# Patient Record
Sex: MALE | Race: WHITE | NOT HISPANIC OR LATINO | Employment: FULL TIME | URBAN - METROPOLITAN AREA
[De-identification: names, ages, dates, MRNs, and addresses within clinical notes are randomized per-mention and may not be internally consistent; named-entity substitution may affect disease eponyms.]

---

## 2017-06-05 ENCOUNTER — HOSPITAL ENCOUNTER (OUTPATIENT)
Dept: RADIOLOGY | Facility: MEDICAL CENTER | Age: 57
End: 2017-06-05
Attending: INTERNAL MEDICINE
Payer: COMMERCIAL

## 2017-06-15 ENCOUNTER — HOSPITAL ENCOUNTER (OUTPATIENT)
Dept: RADIOLOGY | Facility: MEDICAL CENTER | Age: 57
End: 2017-06-15
Attending: INTERNAL MEDICINE
Payer: COMMERCIAL

## 2017-06-15 DIAGNOSIS — R13.19 CONSTANT LOW-GRADE DYSPHAGIA: ICD-10-CM

## 2017-06-15 PROCEDURE — 74220 X-RAY XM ESOPHAGUS 1CNTRST: CPT

## 2017-06-15 PROCEDURE — 700101 HCHG RX REV CODE 250: Performed by: INTERNAL MEDICINE

## 2017-06-15 RX ADMIN — BARIUM SULFATE 700 MG: 700 TABLET ORAL at 14:35

## 2017-06-29 ENCOUNTER — APPOINTMENT (OUTPATIENT)
Dept: RADIOLOGY | Facility: MEDICAL CENTER | Age: 57
End: 2017-06-29
Attending: EMERGENCY MEDICINE
Payer: COMMERCIAL

## 2017-06-29 ENCOUNTER — HOSPITAL ENCOUNTER (EMERGENCY)
Facility: MEDICAL CENTER | Age: 57
End: 2017-06-29
Attending: EMERGENCY MEDICINE | Admitting: EMERGENCY MEDICINE
Payer: COMMERCIAL

## 2017-06-29 VITALS
TEMPERATURE: 98.4 F | RESPIRATION RATE: 16 BRPM | OXYGEN SATURATION: 94 % | SYSTOLIC BLOOD PRESSURE: 117 MMHG | HEIGHT: 68 IN | WEIGHT: 162.04 LBS | BODY MASS INDEX: 24.56 KG/M2 | HEART RATE: 78 BPM | DIASTOLIC BLOOD PRESSURE: 81 MMHG

## 2017-06-29 DIAGNOSIS — N20.1 URETERAL STONE: ICD-10-CM

## 2017-06-29 LAB
ALBUMIN SERPL BCP-MCNC: 4.2 G/DL (ref 3.2–4.9)
ALBUMIN/GLOB SERPL: 1.8 G/DL
ALP SERPL-CCNC: 53 U/L (ref 30–99)
ALT SERPL-CCNC: 36 U/L (ref 2–50)
ANION GAP SERPL CALC-SCNC: 9 MMOL/L (ref 0–11.9)
APPEARANCE UR: ABNORMAL
AST SERPL-CCNC: 43 U/L (ref 12–45)
BACTERIA #/AREA URNS HPF: ABNORMAL /HPF
BASOPHILS # BLD AUTO: 0.8 % (ref 0–1.8)
BASOPHILS # BLD: 0.09 K/UL (ref 0–0.12)
BILIRUB SERPL-MCNC: 0.6 MG/DL (ref 0.1–1.5)
BILIRUB UR QL STRIP.AUTO: NEGATIVE
BUN SERPL-MCNC: 16 MG/DL (ref 8–22)
CALCIUM SERPL-MCNC: 9.3 MG/DL (ref 8.4–10.2)
CHLORIDE SERPL-SCNC: 103 MMOL/L (ref 96–112)
CO2 SERPL-SCNC: 26 MMOL/L (ref 20–33)
COLOR UR: YELLOW
CREAT SERPL-MCNC: 1.17 MG/DL (ref 0.5–1.4)
CULTURE IF INDICATED INDCX: YES UA CULTURE
EOSINOPHIL # BLD AUTO: 0.56 K/UL (ref 0–0.51)
EOSINOPHIL NFR BLD: 4.7 % (ref 0–6.9)
ERYTHROCYTE [DISTWIDTH] IN BLOOD BY AUTOMATED COUNT: 47.3 FL (ref 35.9–50)
GFR SERPL CREATININE-BSD FRML MDRD: >60 ML/MIN/1.73 M 2
GLOBULIN SER CALC-MCNC: 2.4 G/DL (ref 1.9–3.5)
GLUCOSE SERPL-MCNC: 139 MG/DL (ref 65–99)
GLUCOSE UR STRIP.AUTO-MCNC: NEGATIVE MG/DL
HCT VFR BLD AUTO: 43.6 % (ref 42–52)
HGB BLD-MCNC: 15 G/DL (ref 14–18)
IMM GRANULOCYTES # BLD AUTO: 0.07 K/UL (ref 0–0.11)
IMM GRANULOCYTES NFR BLD AUTO: 0.6 % (ref 0–0.9)
KETONES UR STRIP.AUTO-MCNC: NEGATIVE MG/DL
LEUKOCYTE ESTERASE UR QL STRIP.AUTO: NEGATIVE
LYMPHOCYTES # BLD AUTO: 1.05 K/UL (ref 1–4.8)
LYMPHOCYTES NFR BLD: 8.8 % (ref 22–41)
MCH RBC QN AUTO: 32.4 PG (ref 27–33)
MCHC RBC AUTO-ENTMCNC: 34.4 G/DL (ref 33.7–35.3)
MCV RBC AUTO: 94.2 FL (ref 81.4–97.8)
MICRO URNS: ABNORMAL
MONOCYTES # BLD AUTO: 0.54 K/UL (ref 0–0.85)
MONOCYTES NFR BLD AUTO: 4.5 % (ref 0–13.4)
MUCOUS THREADS #/AREA URNS HPF: ABNORMAL /HPF
NEUTROPHILS # BLD AUTO: 9.59 K/UL (ref 1.82–7.42)
NEUTROPHILS NFR BLD: 80.6 % (ref 44–72)
NITRITE UR QL STRIP.AUTO: NEGATIVE
NRBC # BLD AUTO: 0 K/UL
NRBC BLD AUTO-RTO: 0 /100 WBC
PH UR STRIP.AUTO: 7 [PH]
PLATELET # BLD AUTO: 186 K/UL (ref 164–446)
PMV BLD AUTO: 10.7 FL (ref 9–12.9)
POTASSIUM SERPL-SCNC: 4.4 MMOL/L (ref 3.6–5.5)
PROT SERPL-MCNC: 6.6 G/DL (ref 6–8.2)
PROT UR QL STRIP: NEGATIVE MG/DL
RBC # BLD AUTO: 4.63 M/UL (ref 4.7–6.1)
RBC # URNS HPF: ABNORMAL /HPF
RBC UR QL AUTO: ABNORMAL
SODIUM SERPL-SCNC: 138 MMOL/L (ref 135–145)
SP GR UR STRIP.AUTO: 1.01
WBC # BLD AUTO: 11.9 K/UL (ref 4.8–10.8)
WBC #/AREA URNS HPF: ABNORMAL /HPF

## 2017-06-29 PROCEDURE — 36415 COLL VENOUS BLD VENIPUNCTURE: CPT

## 2017-06-29 PROCEDURE — 74176 CT ABD & PELVIS W/O CONTRAST: CPT

## 2017-06-29 PROCEDURE — 85025 COMPLETE CBC W/AUTO DIFF WBC: CPT

## 2017-06-29 PROCEDURE — 96374 THER/PROPH/DIAG INJ IV PUSH: CPT

## 2017-06-29 PROCEDURE — 81001 URINALYSIS AUTO W/SCOPE: CPT

## 2017-06-29 PROCEDURE — 80053 COMPREHEN METABOLIC PANEL: CPT

## 2017-06-29 PROCEDURE — 700111 HCHG RX REV CODE 636 W/ 250 OVERRIDE (IP): Performed by: EMERGENCY MEDICINE

## 2017-06-29 PROCEDURE — 87086 URINE CULTURE/COLONY COUNT: CPT

## 2017-06-29 PROCEDURE — 99284 EMERGENCY DEPT VISIT MOD MDM: CPT

## 2017-06-29 PROCEDURE — 96375 TX/PRO/DX INJ NEW DRUG ADDON: CPT

## 2017-06-29 PROCEDURE — 96361 HYDRATE IV INFUSION ADD-ON: CPT

## 2017-06-29 PROCEDURE — 96376 TX/PRO/DX INJ SAME DRUG ADON: CPT

## 2017-06-29 PROCEDURE — 700105 HCHG RX REV CODE 258: Performed by: EMERGENCY MEDICINE

## 2017-06-29 RX ORDER — SODIUM CHLORIDE 9 MG/ML
1000 INJECTION, SOLUTION INTRAVENOUS ONCE
Status: COMPLETED | OUTPATIENT
Start: 2017-06-29 | End: 2017-06-29

## 2017-06-29 RX ORDER — ONDANSETRON 2 MG/ML
4 INJECTION INTRAMUSCULAR; INTRAVENOUS ONCE
Status: COMPLETED | OUTPATIENT
Start: 2017-06-29 | End: 2017-06-29

## 2017-06-29 RX ORDER — HYDROCODONE BITARTRATE AND ACETAMINOPHEN 5; 325 MG/1; MG/1
1-2 TABLET ORAL EVERY 6 HOURS PRN
Qty: 20 TAB | Refills: 0 | Status: ON HOLD | OUTPATIENT
Start: 2017-06-29 | End: 2017-08-03

## 2017-06-29 RX ORDER — ONDANSETRON 4 MG/1
4 TABLET, ORALLY DISINTEGRATING ORAL EVERY 6 HOURS PRN
Qty: 20 TAB | Refills: 0 | Status: ON HOLD | OUTPATIENT
Start: 2017-06-29 | End: 2017-08-03

## 2017-06-29 RX ORDER — KETOROLAC TROMETHAMINE 30 MG/ML
15 INJECTION, SOLUTION INTRAMUSCULAR; INTRAVENOUS ONCE
Status: COMPLETED | OUTPATIENT
Start: 2017-06-29 | End: 2017-06-29

## 2017-06-29 RX ADMIN — KETOROLAC TROMETHAMINE 15 MG: 30 INJECTION, SOLUTION INTRAMUSCULAR at 04:24

## 2017-06-29 RX ADMIN — ONDANSETRON 4 MG: 2 INJECTION INTRAMUSCULAR; INTRAVENOUS at 04:23

## 2017-06-29 RX ADMIN — HYDROMORPHONE HYDROCHLORIDE 1 MG: 1 INJECTION, SOLUTION INTRAMUSCULAR; INTRAVENOUS; SUBCUTANEOUS at 06:40

## 2017-06-29 RX ADMIN — SODIUM CHLORIDE 1000 ML: 9 INJECTION, SOLUTION INTRAVENOUS at 04:27

## 2017-06-29 RX ADMIN — HYDROMORPHONE HYDROCHLORIDE 1 MG: 1 INJECTION, SOLUTION INTRAMUSCULAR; INTRAVENOUS; SUBCUTANEOUS at 04:27

## 2017-06-29 ASSESSMENT — PAIN SCALES - GENERAL
PAINLEVEL_OUTOF10: 8
PAINLEVEL_OUTOF10: 8

## 2017-06-29 NOTE — ED AVS SNAPSHOT
6/29/2017    Enrique Joe  [address] [place] [address]    Dear Enrique:    [facility] wants to ensure your discharge home is safe and you or your loved ones have had all of your questions answered regarding your care after you leave the hospital.    Below is a list of resources and contact information should you have any questions regarding your hospital stay, follow-up instructions, or active medical symptoms.    Questions or Concerns Regarding… Contact   Medical Questions Related to Your Discharge  (7 days a week, 8am-5pm) Contact a Nurse Care Coordinator   [phone]   Medical Questions Not Related to Your Discharge  (24 hours a day / 7 days a week)  Contact the Nurse Health Line   [phone]    Medications or Discharge Instructions Refer to your discharge packet   or contact your [facility] Primary Care Provider   [phone]   Follow-up Appointment(s) Schedule your appointment via [employer]   or contact Scheduling [phone]   Billing Review your statement via [employer]  or contact Billing [phone]   Medical Records Review your records via [employer]   or contact Medical Records [phone]     You may receive a telephone call within two days of discharge. This call is to make certain you understand your discharge instructions and have the opportunity to have any questions answered. You can also easily access your medical information, test results and upcoming appointments via the [employer] free online health management tool. You can learn more and sign up at [employer]. For assistance setting up your [employer] account, please call [phone].    Once again, we want to ensure your discharge home is safe and that you have a clear understanding of any next steps in your care. If you have any questions or concerns, please do not hesitate to contact us, we are here for you. Thank you for choosing [facility] for your healthcare needs.    Sincerely,    Your [facility] Healthcare Team

## 2017-06-29 NOTE — ED PROVIDER NOTES
ED Provider Note    ER PROVIDER NOTE    Scribed for Yevgeniy Schofield M.D.  by Yevgeniy Schofield. 6/29/2017 at 4:17 AM.    Primary Care Provider: Deo IZAGUIRRE M.D.  Means of Arrival: Self  History obtained from: Patient  History limited by: None    CHIEF COMPLAINT  Chief Complaint   Patient presents with   • N/V   • Flank Pain     right side, started yesterday morning, hx kidney stones       HPI  Enrique Joe is a 56 y.o. male who presents to the emergency department complaining of right-sided flank pain. Patient has long history of recurrent kidney stones. Last stone was approximately September. He reports that he began feeling the pain yesterday, right, sharp, colicky with some radiation to his groin, similar to stones in the past and has worsened. He's had some nausea as well as a few episodes of emesis secondary to pain. He denies any other abdominal pain, fevers or chills. No dysuria or hematuria.    REVIEW OF SYSTEMS  Pertinent positives include flank pain. Pertinent negatives include no fever. See HPI for details. All other systems reviewed and are negative.    PAST MEDICAL HISTORY   has a past medical history of Kidney stone.    SURGICAL HISTORY   has past surgical history that includes cholecystectomy; lithotripsy; hydrocelectomy child; and vocal cord stripping.    FAMILY HISTORY  Family History   Problem Relation Age of Onset   • Heart Attack Father    • Heart Attack Brother        SOCIAL HISTORY  Social History     Social History   • Marital Status: Single     Spouse Name: N/A   • Number of Children: N/A   • Years of Education: N/A     Social History Main Topics   • Smoking status: Former Smoker -- 0.50 packs/day for 5 years     Types: Cigarettes     Quit date: 06/30/1985   • Smokeless tobacco: None   • Alcohol Use: No   • Drug Use: No   • Sexual Activity: Not Asked     Other Topics Concern   • None     Social History Narrative      History   Drug Use No       CURRENT MEDICATIONS  Home Medications      "Reviewed by Geovanna Toro R.N. (Registered Nurse) on 06/29/17 at 0433  Med List Status: Complete    Medication Last Dose Status    aspirin 81 MG tablet 6/28/2017 Active    lamotrigine (LAMICTAL) 200 MG tablet 6/28/2017 Active    minocycline (MINOCIN) 100 MG Cap 6/28/2017 Active    tamsulosin (FLOMAX) 0.4 MG capsule 6/28/2017 Active    zolpidem (AMBIEN) 5 MG TABS  Active                ALLERGIES  Allergies   Allergen Reactions   • Sulfa Drugs        PHYSICAL EXAM  VITAL SIGNS: /81 mmHg  Pulse 67  Temp(Src) 36.9 °C (98.4 °F)  Resp 18  Ht 1.727 m (5' 7.99\")  Wt 73.5 kg (162 lb 0.6 oz)  BMI 24.64 kg/m2  SpO2 93%  Pulse ox interpretation: I interpret this pulse ox as normal.    Constitutional: Alert, uncomfortable  HENT: No signs of trauma, Bilateral external ears normal, Nose normal.   Eyes: Pupils are equal and reactive, Conjunctiva normal, Non-icteric.   Neck: Normal range of motion, No tenderness, Supple, No stridor.   Lymphatic: No lymphadenopathy noted.   Cardiovascular: Regular rate and rhythm, no murmurs.   Thorax & Lungs: Normal breath sounds, No respiratory distress, No wheezing, No chest tenderness.   Abdomen: Bowel sounds normal, Soft, No tenderness, No masses, No pulsatile masses. No peritoneal signs.  Skin: Warm, Dry, No erythema, No rash.   Back: No bony tenderness, No CVA tenderness.   Extremities: Intact distal pulses, No edema, No tenderness, No cyanosis, Negative Junior's sign.  Musculoskeletal: Good range of motion in all major joints. No tenderness to palpation or major deformities noted.   Neurologic: Alert , Normal motor function, Normal sensory function, No focal deficits noted.   Psychiatric: Affect normal, Judgment normal, Mood normal.     DIAGNOSTIC STUDIES / PROCEDURES        LABS  Results for orders placed or performed during the hospital encounter of 06/29/17   CBC WITH DIFFERENTIAL   Result Value Ref Range    WBC 11.9 (H) 4.8 - 10.8 K/uL    RBC 4.63 (L) 4.70 - 6.10 M/uL    " Hemoglobin 15.0 14.0 - 18.0 g/dL    Hematocrit 43.6 42.0 - 52.0 %    MCV 94.2 81.4 - 97.8 fL    MCH 32.4 27.0 - 33.0 pg    MCHC 34.4 33.7 - 35.3 g/dL    RDW 47.3 35.9 - 50.0 fL    Platelet Count 186 164 - 446 K/uL    MPV 10.7 9.0 - 12.9 fL    Neutrophils-Polys 80.60 (H) 44.00 - 72.00 %    Lymphocytes 8.80 (L) 22.00 - 41.00 %    Monocytes 4.50 0.00 - 13.40 %    Eosinophils 4.70 0.00 - 6.90 %    Basophils 0.80 0.00 - 1.80 %    Immature Granulocytes 0.60 0.00 - 0.90 %    Nucleated RBC 0.00 /100 WBC    Neutrophils (Absolute) 9.59 (H) 1.82 - 7.42 K/uL    Lymphs (Absolute) 1.05 1.00 - 4.80 K/uL    Monos (Absolute) 0.54 0.00 - 0.85 K/uL    Eos (Absolute) 0.56 (H) 0.00 - 0.51 K/uL    Baso (Absolute) 0.09 0.00 - 0.12 K/uL    Immature Granulocytes (abs) 0.07 0.00 - 0.11 K/uL    NRBC (Absolute) 0.00 K/uL   COMP METABOLIC PANEL   Result Value Ref Range    Sodium 138 135 - 145 mmol/L    Potassium 4.4 3.6 - 5.5 mmol/L    Chloride 103 96 - 112 mmol/L    Co2 26 20 - 33 mmol/L    Anion Gap 9.0 0.0 - 11.9    Glucose 139 (H) 65 - 99 mg/dL    Bun 16 8 - 22 mg/dL    Creatinine 1.17 0.50 - 1.40 mg/dL    Calcium 9.3 8.4 - 10.2 mg/dL    AST(SGOT) 43 12 - 45 U/L    ALT(SGPT) 36 2 - 50 U/L    Alkaline Phosphatase 53 30 - 99 U/L    Total Bilirubin 0.6 0.1 - 1.5 mg/dL    Albumin 4.2 3.2 - 4.9 g/dL    Total Protein 6.6 6.0 - 8.2 g/dL    Globulin 2.4 1.9 - 3.5 g/dL    A-G Ratio 1.8 g/dL   URINALYSIS CULTURE, IF INDICATED   Result Value Ref Range    Micro Urine Req Microscopic     Color Yellow     Character Cloudy (A)     Specific Gravity 1.015 <1.035    Ph 7.0 5.0-8.0    Glucose Negative Negative mg/dL    Ketones Negative Negative mg/dL    Protein Negative Negative mg/dL    Bilirubin Negative Negative    Nitrite Negative Negative    Leukocyte Esterase Negative Negative    Occult Blood Large (A) Negative    Culture Indicated Yes UA Culture   ESTIMATED GFR   Result Value Ref Range    GFR If African American >60 >60 mL/min/1.73 m 2    GFR If Non  African American >60 >60 mL/min/1.73 m 2   URINE MICROSCOPIC (W/UA)   Result Value Ref Range    WBC 0-2 (A) /hpf    -150 (A) /hpf    Bacteria Few (A) None /hpf    Mucous Threads Moderate /hpf       All labs reviewed by me.    RADIOLOGY  CT-RENAL COLIC EVALUATION(A/P W/O)   Final Result      1.  5.4 mm right ureteropelvic junction stone with minimal right-sided hydronephrosis.   2.  Multiple punctate nonobstructive right renal stones.   3.  Status post cholecystectomy.        The radiologist's interpretation of all radiological studies have been reviewed by me.    COURSE & MEDICAL DECISION MAKING  Nursing notes, VS, PMSFHx reviewed in chart.    4:17 AM Patient seen and examined at bedside. Patient will be treated with IV Zofran, ketorolac, hydromorphone, and fluids as he is vomiting and for hydration. Ordered for blood work, urinalysis, CT scan to evaluate his symptoms.   5:26 AM patient reevaluated, much more comfortable. States pain almost entirely resolved at this time. Updated on results, pending urinalysis      Decision Making:  This is a very pleasant 56 y.o. male presented with flank pain, found to have 5 mm ureteral stone. Given its size, as well as lack of significant hydronephrosis, as well as improvement in symptoms we'll try conservative management as outpatient. Prescription for Norco, has NSAIDs, prescription for Zofran and already has Flomax. He is followed by Dr. Haile will follow-up with him for further care and recheck. He has no fevers or symptoms to suggest concomitant urinary tract infection as well. No other evidence of surgical intra-abdominal pathology on his CT scan    I reviewed prescription monitoring program for patient's narcotic use before prescribing a scheduled drug.The patient will not drink alcohol nor drive with prescribed medications. The patient will return for new or worsening symptoms and is stable at the time of discharge.    The patient is referred to a primary physician  for blood pressure management, diabetic screening, and for all other preventative health concerns.    DISPOSITION:  Patient will be discharged home in stable condition.    FOLLOW UP:  Shahab Haile M.D.  [address]  [address]  [facility] [address]  [phone]    Schedule an appointment as soon as possible for a visit        OUTPATIENT MEDICATIONS:  New Prescriptions    HYDROCODONE-ACETAMINOPHEN (NORCO) 5-325 MG TAB PER TABLET    Take 1-2 Tabs by mouth every 6 hours as needed.    ONDANSETRON (ZOFRAN ODT) 4 MG TABLET DISPERSIBLE    Take 1 Tab by mouth every 6 hours as needed for Nausea/Vomiting.           FINAL IMPRESSION  1. Ureteral stone         The note accurately reflects work and decisions made by me.  Yevgeniy Schofield  6/29/2017  7:01 AM

## 2017-06-29 NOTE — ED AVS SNAPSHOT
[employer] Access Code: [email or web address]  Expires: 7/15/2017  2:01 PM    [employer]  A secure, online tool to manage your health information     [employer]’s [employer]® is a secure, online tool that connects you to your personalized health information from the privacy of your home -- day or night - making it very easy for you to manage your healthcare. Once the activation process is completed, you can even access your medical information using the [employer] rula, which is available for free in the [employer] Rula store or Google Play store.     [employer] provides the following levels of access (as shown below):   My Chart Features   [facility] Primary Care Doctor [facility]  Specialists [facility]  Urgent  Care Non-[facility]  Primary Care  Doctor   Email your healthcare team securely and privately 24/7 X X X X   Manage appointments: schedule your next appointment; view details of past/upcoming appointments X      Request prescription refills. X      View recent personal medical records, including lab and immunizations X X X X   View health record, including health history, allergies, medications X X X X   Read reports about your outpatient visits, procedures, consult and ER notes X X X X   See your discharge summary, which is a recap of your hospital and/or ER visit that includes your diagnosis, lab results, and care plan. X X       How to register for [employer]:  1. Go to  [email or web address].  2. Click on the Sign Up Now box, which takes you to the New Member Sign Up page. You will need to provide the following information:  a. Enter your [employer] Access Code exactly as it appears at the top of this page. (You will not need to use this code after you’ve completed the sign-up process. If you do not sign up before the expiration date, you must request a new code.)   b. Enter your date of birth.   c. Enter your home email address.   d. Click Submit, and follow the next screen’s instructions.  3. Create a [employer] Oy ID. This will be your [employer]  login ID and cannot be changed, so think of one that is secure and easy to remember.  4. Create a [email or web address] password. You can change your password at any time.  5. Enter your Password Reset Question and Answer. This can be used at a later time if you forget your password.   6. Enter your e-mail address. This allows you to receive e-mail notifications when new information is available in [email or web address].  7. Click Sign Up. You can now view your health information.    For assistance activating your [email or web address] account, call [phone]

## 2017-06-29 NOTE — ED AVS SNAPSHOT
Home Care Instructions                                                                                                                Enrique Joe   MRN: [id number]    Department:  [facility], Emergency Dept   Date of Visit:  6/29/2017            [facility], Emergency Dept    [address]    [place] [address]    Phone:  [phone]      You were seen by     1. Yevgeniy Schofield M.D.    2. Ade Raines M.D.      Your Diagnosis Was     Ureteral stone     N20.1       These are the medications you received during your hospitalization from 06/29/2017 0411 to 06/29/2017 0650     Date/Time Order Dose Route Action    06/29/2017 0427 NS infusion 1,000 mL 1,000 mL Intravenous New Bag    06/29/2017 0424 ketorolac (TORADOL) injection 15 mg 15 mg Intravenous Given    06/29/2017 0423 ondansetron (ZOFRAN) syringe/vial injection 4 mg 4 mg Intravenous Given    06/29/2017 0427 HYDROmorphone (DILAUDID) injection 1 mg 1 mg Intravenous Given    06/29/2017 0640 HYDROmorphone (DILAUDID) injection 1 mg 1 mg Intravenous Given      Follow-up Information     1. Schedule an appointment as soon as possible for a visit with Shahab Haile M.D..    Specialty:  Urology    Contact information    [address]  [address]  [place] [address] [phone]        Medication Information     Review all of your home medications and newly ordered medications with your primary doctor and/or pharmacist as soon as possible. Follow medication instructions as directed by your doctor and/or pharmacist.     Please keep your complete medication list with you and share with your physician. Update the information when medications are discontinued, doses are changed, or new medications (including over-the-counter products) are added; and carry medication information at all times in the event of emergency situations.               Medication List      START taking these medications        Instructions    Morning  Afternoon Evening Bedtime    hydrocodone-acetaminophen 5-325 MG Tabs per tablet   Commonly known as:  NORCO        Take 1-2 Tabs by mouth every 6 hours as needed.   Dose:  1-2 Tab                        ondansetron 4 MG Tbdp   Commonly known as:  ZOFRAN ODT        Take 1 Tab by mouth every 6 hours as needed for Nausea/Vomiting.   Dose:  4 mg                          ASK your doctor about these medications        Instructions    Morning Afternoon Evening Bedtime    AMBIEN 5 MG Tabs   Generic drug:  zolpidem        Take 5 mg by mouth at bedtime as needed.   Dose:  5 mg                        aspirin 81 MG tablet        Take 81 mg by mouth every 48 hours.   Dose:  81 mg                        lamotrigine 200 MG tablet   Commonly known as:  LAMICTAL        Take 200 mg by mouth every day.   Dose:  200 mg                        minocycline 100 MG Caps   Commonly known as:  MINOCIN        Take 100 mg by mouth 2 times a day.   Dose:  100 mg                        tamsulosin 0.4 MG capsule   Commonly known as:  FLOMAX        Take 1 Cap by mouth ONE-HALF HOUR AFTER BREAKFAST.   Dose:  0.4 mg                             Where to Get Your Medications      You can get these medications from any pharmacy     Bring a paper prescription for each of these medications    - hydrocodone-acetaminophen 5-325 MG Tabs per tablet  - ondansetron 4 MG Tbdp            Procedures and tests performed during your visit     CBC WITH DIFFERENTIAL    COMP METABOLIC PANEL    CT-RENAL COLIC EVALUATION(A/P W/O)    ESTIMATED GFR    IV Saline Lock    URINALYSIS CULTURE, IF INDICATED    URINE MICROSCOPIC (W/UA)        Discharge Instructions       Kidney Stones  Kidney stones (urolithiasis) are deposits that form inside your kidneys. The intense pain is caused by the stone moving through the urinary tract. When the stone moves, the ureter goes into spasm around the stone. The stone is usually passed in the urine.   CAUSES   · A disorder that makes certain  neck glands produce too much parathyroid hormone (primary hyperparathyroidism).  · A buildup of uric acid crystals, similar to gout in your joints.  · Narrowing (stricture) of the ureter.  · A kidney obstruction present at birth (congenital obstruction).  · Previous surgery on the kidney or ureters.  · Numerous kidney infections.  SYMPTOMS   · Feeling sick to your stomach (nauseous).  · Throwing up (vomiting).  · Blood in the urine (hematuria).  · Pain that usually spreads (radiates) to the groin.  · Frequency or urgency of urination.  DIAGNOSIS   · Taking a history and physical exam.  · Blood or urine tests.  · CT scan.  · Occasionally, an examination of the inside of the urinary bladder (cystoscopy) is performed.  TREATMENT   · Observation.  · Increasing your fluid intake.  · Extracorporeal shock wave lithotripsy--This is a noninvasive procedure that uses shock waves to break up kidney stones.  · Surgery may be needed if you have severe pain or persistent obstruction. There are various surgical procedures. Most of the procedures are performed with the use of small instruments. Only small incisions are needed to accommodate these instruments, so recovery time is minimized.  The size, location, and chemical composition are all important variables that will determine the proper choice of action for you. Talk to your health care provider to better understand your situation so that you will minimize the risk of injury to yourself and your kidney.   HOME CARE INSTRUCTIONS   · Drink enough water and fluids to keep your urine clear or pale yellow. This will help you to pass the stone or stone fragments.  · Strain all urine through the provided strainer. Keep all particulate matter and stones for your health care provider to see. The stone causing the pain may be as small as a grain of salt. It is very important to use the strainer each and every time you pass your urine. The collection of your stone will allow your health  care provider to analyze it and verify that a stone has actually passed. The stone analysis will often identify what you can do to reduce the incidence of recurrences.  · Only take over-the-counter or prescription medicines for pain, discomfort, or fever as directed by your health care provider.  · Make a follow-up appointment with your health care provider as directed.  · Get follow-up X-rays if required. The absence of pain does not always mean that the stone has passed. It may have only stopped moving. If the urine remains completely obstructed, it can cause loss of kidney function or even complete destruction of the kidney. It is your responsibility to make sure X-rays and follow-ups are completed. Ultrasounds of the kidney can show blockages and the status of the kidney. Ultrasounds are not associated with any radiation and can be performed easily in a matter of minutes.  SEEK MEDICAL CARE IF:  · You experience pain that is progressive and unresponsive to any pain medicine you have been prescribed.  SEEK IMMEDIATE MEDICAL CARE IF:   · Pain cannot be controlled with the prescribed medicine.  · You have a fever or shaking chills.  · The severity or intensity of pain increases over 18 hours and is not relieved by pain medicine.  · You develop a new onset of abdominal pain.  · You feel faint or pass out.  · You are unable to urinate.  MAKE SURE YOU:   · Understand these instructions.  · Will watch your condition.  · Will get help right away if you are not doing well or get worse.     This information is not intended to replace advice given to you by your health care provider. Make sure you discuss any questions you have with your health care provider.     Document Released: 12/18/2006 Document Revised: 01/08/2016 Document Reviewed: 05/21/2014  Elsevier Interactive Patient Education ©2016 [employer]            Patient Information     Patient Information    Following emergency treatment: all patient requiring  follow-up care must return either to a private physician or a clinic if your condition worsens before you are able to obtain further medical attention, please return to the emergency room.     Billing Information    At [facility], we work to make the billing process streamlined for our patients.  Our Representatives are here to answer any questions you may have regarding your hospital bill.  If you have insurance coverage and have supplied your insurance information to us, we will submit a claim to your insurer on your behalf.  Should you have any questions regarding your bill, we can be reached online or by phone as follows:  Online: You are able pay your bills online or live chat with our representatives about any billing questions you may have. We are here to help Monday - Friday from 8:00am to 7:30pm and 9:00am - 12:00pm on Saturdays.  Please visit [email or web address]  for more information.   Phone:  [phone] or [phone]    Please note that your emergency physician, surgeon, pathologist, radiologist, anesthesiologist, and other specialists are not employed by [facility] and will therefore bill separately for their services.  Please contact them directly for any questions concerning their bills at the numbers below:     Emergency Physician Services:  [phone]  [facility]:  [phone]  [facility]:  [phone]  [facility]:  [phone]    1. Your final bill may vary from the amount quoted upon discharge if all procedures are not complete at that time, or if your doctor has additional procedures of which we are not aware. You will receive an additional bill if you return to the Emergency Department at [facility] for suture removal regardless of the facility of which the sutures were placed.     2. Please arrange for settlement of this account at the emergency registration.    3. All self-pay accounts are due in  full at the time of treatment.  If you are unable to meet this obligation then payment is expected within 4-5 days.     4. If you have had radiology studies (CT, X-ray, Ultrasound, MRI), you have received a preliminary result during your emergency department visit. Please contact the radiology department [phone] to receive a copy of your final result. Please discuss the Final result with your primary physician or with the follow up physician provided.     Crisis Hotline:  [place] Crisis Hotline:  3-557-ITVDXAG or 1-666.973.9062  [place] Crisis Hotline:    1-365.261.4410 or 235-235-4729         ED Discharge Follow Up Questions    1. In order to provide you with very good care, we would like to follow up with a phone call in the next few days.  May we have your permission to contact you?     YES /  NO    2. What is the best phone number to call you? (       )_____-__________    3. What is the best time to call you?      Morning  /  Afternoon  /  Evening                   Patient Signature:  ____________________________________________________________    Date:  ____________________________________________________________

## 2017-07-01 LAB
BACTERIA UR CULT: NORMAL
SIGNIFICANT IND 70042: NORMAL
SITE SITE: NORMAL
SOURCE SOURCE: NORMAL

## 2017-07-05 ENCOUNTER — HOSPITAL ENCOUNTER (OUTPATIENT)
Dept: RADIOLOGY | Facility: MEDICAL CENTER | Age: 57
End: 2017-07-05
Attending: FAMILY MEDICINE
Payer: COMMERCIAL

## 2017-07-05 DIAGNOSIS — N20.1 CALCULUS OF URETER: ICD-10-CM

## 2017-07-05 PROCEDURE — 74000 DX-ABDOMEN-1 VIEW: CPT

## 2017-07-11 ENCOUNTER — HOSPITAL ENCOUNTER (OUTPATIENT)
Dept: LAB | Facility: MEDICAL CENTER | Age: 57
End: 2017-07-11
Attending: UROLOGY
Payer: COMMERCIAL

## 2017-07-11 LAB
ALBUMIN SERPL BCP-MCNC: 3.8 G/DL (ref 3.2–4.9)
ALBUMIN/GLOB SERPL: 1.3 G/DL
ALP SERPL-CCNC: 68 U/L (ref 30–99)
ALT SERPL-CCNC: 26 U/L (ref 2–50)
ANION GAP SERPL CALC-SCNC: 6 MMOL/L (ref 0–11.9)
AST SERPL-CCNC: 22 U/L (ref 12–45)
BASOPHILS # BLD AUTO: 1.2 % (ref 0–1.8)
BASOPHILS # BLD: 0.12 K/UL (ref 0–0.12)
BILIRUB SERPL-MCNC: 0.4 MG/DL (ref 0.1–1.5)
BUN SERPL-MCNC: 22 MG/DL (ref 8–22)
CALCIUM SERPL-MCNC: 9.7 MG/DL (ref 8.5–10.5)
CHLORIDE SERPL-SCNC: 101 MMOL/L (ref 96–112)
CO2 SERPL-SCNC: 28 MMOL/L (ref 20–33)
CREAT SERPL-MCNC: 1.63 MG/DL (ref 0.5–1.4)
EOSINOPHIL # BLD AUTO: 0.79 K/UL (ref 0–0.51)
EOSINOPHIL NFR BLD: 7.8 % (ref 0–6.9)
ERYTHROCYTE [DISTWIDTH] IN BLOOD BY AUTOMATED COUNT: 46.5 FL (ref 35.9–50)
GFR SERPL CREATININE-BSD FRML MDRD: 44 ML/MIN/1.73 M 2
GLOBULIN SER CALC-MCNC: 2.9 G/DL (ref 1.9–3.5)
GLUCOSE SERPL-MCNC: 99 MG/DL (ref 65–99)
HCT VFR BLD AUTO: 43.5 % (ref 42–52)
HGB BLD-MCNC: 14.6 G/DL (ref 14–18)
IMM GRANULOCYTES # BLD AUTO: 0.02 K/UL (ref 0–0.11)
IMM GRANULOCYTES NFR BLD AUTO: 0.2 % (ref 0–0.9)
LYMPHOCYTES # BLD AUTO: 1.48 K/UL (ref 1–4.8)
LYMPHOCYTES NFR BLD: 14.7 % (ref 22–41)
MCH RBC QN AUTO: 31.7 PG (ref 27–33)
MCHC RBC AUTO-ENTMCNC: 33.6 G/DL (ref 33.7–35.3)
MCV RBC AUTO: 94.4 FL (ref 81.4–97.8)
MONOCYTES # BLD AUTO: 0.61 K/UL (ref 0–0.85)
MONOCYTES NFR BLD AUTO: 6 % (ref 0–13.4)
NEUTROPHILS # BLD AUTO: 7.07 K/UL (ref 1.82–7.42)
NEUTROPHILS NFR BLD: 70.1 % (ref 44–72)
NRBC # BLD AUTO: 0 K/UL
NRBC BLD AUTO-RTO: 0 /100 WBC
PLATELET # BLD AUTO: 366 K/UL (ref 164–446)
PMV BLD AUTO: 10.1 FL (ref 9–12.9)
POTASSIUM SERPL-SCNC: 5.8 MMOL/L (ref 3.6–5.5)
PROT SERPL-MCNC: 6.7 G/DL (ref 6–8.2)
RBC # BLD AUTO: 4.61 M/UL (ref 4.7–6.1)
SODIUM SERPL-SCNC: 135 MMOL/L (ref 135–145)
WBC # BLD AUTO: 10.1 K/UL (ref 4.8–10.8)

## 2017-07-11 PROCEDURE — 85025 COMPLETE CBC W/AUTO DIFF WBC: CPT

## 2017-07-11 PROCEDURE — 80053 COMPREHEN METABOLIC PANEL: CPT

## 2017-07-31 ENCOUNTER — APPOINTMENT (OUTPATIENT)
Dept: ADMISSIONS | Facility: MEDICAL CENTER | Age: 57
End: 2017-07-31
Attending: UROLOGY
Payer: COMMERCIAL

## 2017-08-03 ENCOUNTER — HOSPITAL ENCOUNTER (OUTPATIENT)
Facility: MEDICAL CENTER | Age: 57
End: 2017-08-03
Attending: UROLOGY | Admitting: UROLOGY
Payer: COMMERCIAL

## 2017-08-03 ENCOUNTER — APPOINTMENT (OUTPATIENT)
Dept: RADIOLOGY | Facility: MEDICAL CENTER | Age: 57
End: 2017-08-03
Attending: UROLOGY
Payer: COMMERCIAL

## 2017-08-03 VITALS
RESPIRATION RATE: 15 BRPM | HEART RATE: 78 BPM | TEMPERATURE: 97.1 F | WEIGHT: 154.32 LBS | BODY MASS INDEX: 22.86 KG/M2 | HEIGHT: 69 IN | OXYGEN SATURATION: 97 %

## 2017-08-03 PROBLEM — N20.1 CALCULUS OF URETER: Status: ACTIVE | Noted: 2017-08-03

## 2017-08-03 PROCEDURE — A9270 NON-COVERED ITEM OR SERVICE: HCPCS

## 2017-08-03 PROCEDURE — 500879 HCHG PACK, CYSTO: Performed by: UROLOGY

## 2017-08-03 PROCEDURE — 160041 HCHG SURGERY MINUTES - EA ADDL 1 MIN LEVEL 4: Performed by: UROLOGY

## 2017-08-03 PROCEDURE — 82365 CALCULUS SPECTROSCOPY: CPT

## 2017-08-03 PROCEDURE — 160002 HCHG RECOVERY MINUTES (STAT): Performed by: UROLOGY

## 2017-08-03 PROCEDURE — 160036 HCHG PACU - EA ADDL 30 MINS PHASE I: Performed by: UROLOGY

## 2017-08-03 PROCEDURE — 500062 HCHG BASKET: Performed by: UROLOGY

## 2017-08-03 PROCEDURE — 700111 HCHG RX REV CODE 636 W/ 250 OVERRIDE (IP)

## 2017-08-03 PROCEDURE — 501329 HCHG SET, CYSTO IRRIG Y TUR: Performed by: UROLOGY

## 2017-08-03 PROCEDURE — 160035 HCHG PACU - 1ST 60 MINS PHASE I: Performed by: UROLOGY

## 2017-08-03 PROCEDURE — C1769 GUIDE WIRE: HCPCS | Performed by: UROLOGY

## 2017-08-03 PROCEDURE — C1758 CATHETER, URETERAL: HCPCS | Performed by: UROLOGY

## 2017-08-03 PROCEDURE — 160009 HCHG ANES TIME/MIN: Performed by: UROLOGY

## 2017-08-03 PROCEDURE — 160025 RECOVERY II MINUTES (STATS): Performed by: UROLOGY

## 2017-08-03 PROCEDURE — 88300 SURGICAL PATH GROSS: CPT

## 2017-08-03 PROCEDURE — 160048 HCHG OR STATISTICAL LEVEL 1-5: Performed by: UROLOGY

## 2017-08-03 PROCEDURE — 700101 HCHG RX REV CODE 250

## 2017-08-03 PROCEDURE — 160029 HCHG SURGERY MINUTES - 1ST 30 MINS LEVEL 4: Performed by: UROLOGY

## 2017-08-03 PROCEDURE — 700102 HCHG RX REV CODE 250 W/ 637 OVERRIDE(OP)

## 2017-08-03 PROCEDURE — 160046 HCHG PACU - 1ST 60 MINS PHASE II: Performed by: UROLOGY

## 2017-08-03 PROCEDURE — A4357 BEDSIDE DRAINAGE BAG: HCPCS | Performed by: UROLOGY

## 2017-08-03 RX ORDER — LIDOCAINE HYDROCHLORIDE 10 MG/ML
INJECTION, SOLUTION INFILTRATION; PERINEURAL
Status: COMPLETED
Start: 2017-08-03 | End: 2017-08-03

## 2017-08-03 RX ORDER — LIDOCAINE HYDROCHLORIDE 10 MG/ML
0.5 INJECTION, SOLUTION INFILTRATION; PERINEURAL
Status: COMPLETED | OUTPATIENT
Start: 2017-08-03 | End: 2017-08-03

## 2017-08-03 RX ORDER — OXYCODONE HCL 5 MG/5 ML
SOLUTION, ORAL ORAL
Status: COMPLETED
Start: 2017-08-03 | End: 2017-08-03

## 2017-08-03 RX ORDER — LIDOCAINE AND PRILOCAINE 25; 25 MG/G; MG/G
1 CREAM TOPICAL
Status: COMPLETED | OUTPATIENT
Start: 2017-08-03 | End: 2017-08-03

## 2017-08-03 RX ORDER — HYDROCODONE BITARTRATE AND ACETAMINOPHEN 5; 325 MG/1; MG/1
1 TABLET ORAL EVERY 4 HOURS PRN
Status: DISCONTINUED | OUTPATIENT
Start: 2017-08-03 | End: 2017-08-03 | Stop reason: HOSPADM

## 2017-08-03 RX ORDER — SODIUM CHLORIDE, SODIUM LACTATE, POTASSIUM CHLORIDE, CALCIUM CHLORIDE 600; 310; 30; 20 MG/100ML; MG/100ML; MG/100ML; MG/100ML
INJECTION, SOLUTION INTRAVENOUS CONTINUOUS
Status: DISCONTINUED | OUTPATIENT
Start: 2017-08-03 | End: 2017-08-03 | Stop reason: HOSPADM

## 2017-08-03 RX ORDER — TAMSULOSIN HYDROCHLORIDE 0.4 MG/1
0.4 CAPSULE ORAL EVERY EVENING
COMMUNITY

## 2017-08-03 RX ADMIN — OXYCODONE HYDROCHLORIDE 5 MG: 5 SOLUTION ORAL at 16:45

## 2017-08-03 RX ADMIN — LIDOCAINE HYDROCHLORIDE 0.5 ML: 10 INJECTION, SOLUTION INFILTRATION; PERINEURAL at 13:00

## 2017-08-03 RX ADMIN — SODIUM CHLORIDE, SODIUM LACTATE, POTASSIUM CHLORIDE, CALCIUM CHLORIDE: 600; 310; 30; 20 INJECTION, SOLUTION INTRAVENOUS at 13:00

## 2017-08-03 RX ADMIN — FENTANYL CITRATE 25 MCG: 50 INJECTION, SOLUTION INTRAMUSCULAR; INTRAVENOUS at 17:06

## 2017-08-03 ASSESSMENT — PAIN SCALES - GENERAL
PAINLEVEL_OUTOF10: 2
PAINLEVEL_OUTOF10: 4
PAINLEVEL_OUTOF10: 0
PAINLEVEL_OUTOF10: 2
PAINLEVEL_OUTOF10: 0
PAINLEVEL_OUTOF10: 2
PAINLEVEL_OUTOF10: 0
PAINLEVEL_OUTOF10: 2
PAINLEVEL_OUTOF10: 3
PAINLEVEL_OUTOF10: 2
PAINLEVEL_OUTOF10: 0
PAINLEVEL_OUTOF10: 3

## 2017-08-03 NOTE — IP AVS SNAPSHOT
8/3/2017    Georges Joe  [address] [place] [address]    Dear Georges:    [facility] wants to ensure your discharge home is safe and you or your loved ones have had all of your questions answered regarding your care after you leave the hospital.    Below is a list of resources and contact information should you have any questions regarding your hospital stay, follow-up instructions, or active medical symptoms.    Questions or Concerns Regarding… Contact   Medical Questions Related to Your Discharge  (7 days a week, 8am-5pm) Contact a Nurse Care Coordinator   [phone]   Medical Questions Not Related to Your Discharge  (24 hours a day / 7 days a week)  Contact the Nurse Health Line   [phone]    Medications or Discharge Instructions Refer to your discharge packet   or contact your [facility] Primary Care Provider   [phone]   Follow-up Appointment(s) Schedule your appointment via [employer]   or contact Scheduling [phone]   Billing Review your statement via [employer]  or contact Billing [phone]   Medical Records Review your records via [employer]   or contact Medical Records [phone]     You may receive a telephone call within two days of discharge. This call is to make certain you understand your discharge instructions and have the opportunity to have any questions answered. You can also easily access your medical information, test results and upcoming appointments via the [employer] free online health management tool. You can learn more and sign up at [email or web address]. For assistance setting up your [employer] account, please call [phone].    Once again, we want to ensure your discharge home is safe and that you have a clear understanding of any next steps in your care. If you have any questions or concerns, please do not hesitate to contact us, we are here for you. Thank you for choosing [facility] for your healthcare needs.    Sincerely,    Your [facility] Healthcare Team

## 2017-08-03 NOTE — PROGRESS NOTES
The Medication Reconciliation process has been completed by interviewing the patient    Allergies have been reviewed  Antibiotic use in 30 days - daily [facility]     Home Pharmacy:  [facility] [place]

## 2017-08-03 NOTE — IP AVS SNAPSHOT
" Home Care Instructions                                                                                                                Name:Georges Joe  Medical Record Number:[id number]  CSN: [id number]    YOB: 1960   Age: 56 y.o.  Sex: male  HT:1.753 m (5' 9\") WT: 70 kg (154 lb 5.2 oz)          Admit Date: 8/3/2017     Discharge Date:   Today's Date: 8/3/2017  Attending Doctor:  Shahab Haile M.D.                  Allergies:  Sulfa drugs                Discharge Instructions         ACTIVITY: Rest and take it easy for the first 24 hours.  A responsible adult is recommended to remain with you during that time.  It is normal to feel sleepy.  We encourage you to not do anything that requires balance, judgment or coordination.    MILD FLU-LIKE SYMPTOMS ARE NORMAL. YOU MAY EXPERIENCE GENERALIZED MUSCLE ACHES, THROAT IRRITATION, HEADACHE AND/OR SOME NAUSEA.    FOR 24 HOURS DO NOT:  Drive, operate machinery or run household appliances.  Drink beer or alcoholic beverages.   Make important decisions or sign legal documents.    SPECIAL INSTRUCTIONS: Cystoscopy, Care After  Refer to this sheet in the next few weeks. These instructions provide you with information on caring for yourself after your procedure. Your caregiver may also give you more specific instructions. Your treatment has been planned according to current medical practices, but problems sometimes occur. Call your caregiver if you have any problems or questions after your procedure.  HOME CARE INSTRUCTIONS   Things you can do to ease any discomfort after your procedure include:  · Drinking enough water and fluids to keep your urine clear or pale yellow.  · Taking a warm bath to relieve any burning feelings.  SEEK IMMEDIATE MEDICAL CARE IF:   · You have an increase in blood in your urine.  · You notice blood clots in your urine.  · You have difficulty passing urine.  · You have the chills.  · You have abdominal pain.  · You have a fever or " persistent symptoms for more than 2-3 days.  · You have a fever and your symptoms suddenly get worse.  MAKE SURE YOU:   · Understand these instructions.  · Will watch your condition.  · Will get help right away if you are not doing well or get worse.     This information is not intended to replace advice given to you by your health care provider. Make sure you discuss any questions you have with your health care provider.    Lithotripsy, Care After  Refer to this sheet in the next few weeks. These instructions provide you with information on caring for yourself after your procedure. Your health care provider may also give you more specific instructions. Your treatment has been planned according to current medical practices, but problems sometimes occur. Call your health care provider if you have any problems or questions after your procedure.  WHAT TO EXPECT AFTER THE PROCEDURE   · Your urine may have a red tinge for a few days after treatment. Blood loss is usually minimal.  · You may have soreness in the back or flank area. This usually goes away after a few days. The procedure can cause blotches or bruises on the back where the pressure wave enters the skin. These marks usually cause only minimal discomfort and should disappear in a short time.  · Stone fragments should begin to pass within 24 hours of treatment. However, a delayed passage is not unusual.  · You may have pain, discomfort, and feel sick to your stomach (nauseated) when the crushed fragments of stone are passed down the tube from the kidney to the bladder. Stone fragments can pass soon after the procedure and may last for up to 4-8 weeks.  · A small number of patients may have severe pain when stone fragments are not able to pass, which leads to an obstruction.  · If your stone is greater than 1 inch (2.5 cm) in diameter or if you have multiple stones that have a combined diameter greater than 1 inch (2.5 cm), you may require more than one  "treatment.  · If you had a stent placed prior to your procedure, you may experience some discomfort, especially during urination. You may experience the pain or discomfort in your flank or back, or you may experience a sharp pain or discomfort at the base of your penis or in your lower abdomen. The discomfort usually lasts only a few minutes after urinating.  HOME CARE INSTRUCTIONS   · Rest at home until you feel your energy improving.  · Only take over-the-counter or prescription medicines for pain, discomfort, or fever as directed by your health care provider. Depending on the type of lithotripsy, you may need to take antibiotics and anti-inflammatory medicines for a few days.  · Drink enough water and fluids to keep your urine clear or pale yellow. This helps \"flush\" your kidneys. It helps pass any remaining pieces of stone and prevents stones from coming back.  · Most people can resume daily activities within 1-2 days after standard lithotripsy. It can take longer to recover from laser and percutaneous lithotripsy.  · If the stones are in your urinary system, you may be asked to strain your urine at home to look for stones. Any stones that are found can be sent to a medical lab for examination.  · Visit your health care provider for a follow-up appointment in a few weeks. Your doctor may remove your stent if you have one. Your health care provider will also check to see whether stone particles still remain.  SEEK MEDICAL CARE IF:   · Your pain is not relieved by medicine.  · You have a lasting nauseous feeling.  · You feel there is too much blood in the urine.  · You develop persistent problems with frequent or painful urination that does not at least partially improve after 2 days following the procedure.  · You have a congested cough.  · You feel lightheaded.  · You develop a rash or any other signs that might suggest an allergic problem.  · You develop any reaction or side effects to your medicine(s).  SEEK " IMMEDIATE MEDICAL CARE IF:   · You experience severe back or flank pain or both.  · You see nothing but blood when you urinate.  · You cannot pass any urine at all.  · You have a fever or shaking chills.  · You develop shortness of breath, difficulty breathing, or chest pain.  · You develop vomiting that will not stop after 6-8 hours.  · You have a fainting episode.     This information is not intended to replace advice given to you by your health care provider. Make sure you discuss any questions you have with your health care provider.              DIET: To avoid nausea, slowly advance diet as tolerated, avoiding spicy or greasy foods for the first day.  Add more substantial food to your diet according to your physician's instructions.  Babies can be fed formula or breast milk as soon as they are hungry.  INCREASE FLUIDS AND FIBER TO AVOID CONSTIPATION.      FOLLOW-UP APPOINTMENT:  A follow-up appointment should be arranged with your doctor in 6 weeks call to schedule.    You should CALL YOUR PHYSICIAN if you develop:  Fever greater than 101 degrees F.  Pain not relieved by medication, or persistent nausea or vomiting.  Excessive bleeding (blood soaking through dressing) or unexpected drainage from the wound.  Extreme redness or swelling around the incision site, drainage of pus or foul smelling drainage.  Inability to urinate or empty your bladder within 8 hours.  Problems with breathing or chest pain.    You should call 911 if you develop problems with breathing or chest pain.  If you are unable to contact your doctor or surgical center, you should go to the nearest emergency room or urgent care center.  Physician's telephone #:[phone].    If any questions arise, call your doctor.  If your doctor is not available, please feel free to call the Surgical Center at [phone].  The Center is open Monday through Friday from 7AM to 7PM.  You can also call the [facility] HOTLINE open 24 hours/day, 7 days/week and  speak to a nurse at [phone], or toll free at [phone].    A registered nurse may call you a few days after your surgery to see how you are doing after your procedure.    MEDICATIONS: Resume taking daily medication.  Take prescribed pain medication with food.  If no medication is prescribed, you may take non-aspirin pain medication if needed.  PAIN MEDICATION CAN BE VERY CONSTIPATING.  Take a stool softener or laxative such as senokot, pericolace, or milk of magnesia if needed.     Last pain medication given at 4:45 PM.    If your physician has prescribed pain medication that includes Acetaminophen (Tylenol), do not take additional Acetaminophen (Tylenol) while taking the prescribed medication.    Depression / Suicide Risk    As you are discharged from this [facility] facility, it is important to learn how to keep safe from harming yourself.    Recognize the warning signs:  · Abrupt changes in personality, positive or negative- including increase in energy   · Giving away possessions  · Change in eating patterns- significant weight changes-  positive or negative  · Change in sleeping patterns- unable to sleep or sleeping all the time   · Unwillingness or inability to communicate  · Depression  · Unusual sadness, discouragement and loneliness  · Talk of wanting to die  · Neglect of personal appearance   · Rebelliousness- reckless behavior  · Withdrawal from people/activities they love  · Confusion- inability to concentrate     If you or a loved one observes any of these behaviors or has concerns about self-harm, here's what you can do:  · Talk about it- your feelings and reasons for harming yourself  · Remove any means that you might use to hurt yourself (examples: pills, rope, extension cords, firearm)  · Get professional help from the community (Mental Health, Substance Abuse, psychological counseling)  · Do not be alone:Call your Safe Contact- someone whom you trust who will be there for  "you.  · Call your local CRISIS HOTLINE 891-8877 or 924-933-8751  · Call your local Children's Mobile Crisis Response Team [place] (474) 066-8022 or www.[employer]  · Call the toll free National Suicide Prevention Hotlines   · National Suicide Prevention Lifeline 846-869-SFWP (8521)  · [facility] Network 800-SUICIDE (808-6361)       Medication List      CONTINUE taking these medications        Instructions    Morning Afternoon Evening Bedtime    lamotrigine 200 MG tablet   Commonly known as:  LAMICTAL        Take 200 mg by mouth every morning. Indications: Manic-Depression, Depression   Dose:  200 mg                        minocycline 100 MG Caps   Commonly known as:  MINOCIN        Take 100 mg by mouth every morning.   Dose:  100 mg                        NON SPECIFIED        \"Kavinace Ultra PM\"  At night Melatonin plus other unknown ingredients                        tamsulosin 0.4 MG capsule   Commonly known as:  FLOMAX        Take 0.4 mg by mouth every evening.   Dose:  0.4 mg                                Medication Information     Above and/or attached are the medications your physician expects you to take upon discharge. Review all of your home medications and newly ordered medications with your doctor and/or pharmacist. Follow medication instructions as directed by your doctor and/or pharmacist. Please keep your medication list with you and share with your physician. Update the information when medications are discontinued, doses are changed, or new medications (including over-the-counter products) are added; and carry medication information at all times in the event of emergency situations.        Resources     Quit Smoking / Tobacco Use:    I understand the use of any tobacco products increases my chance of suffering from future heart disease or stroke and could cause other illnesses which may shorten my life. Quitting the use of tobacco products is the single most important thing I can " do to improve my health. For further information on smoking / tobacco cessation call a Toll Free Quit Line at 1-122.757.5843 (*[employer] [place]) or 1-642.618.2864 (American Lung Association) or you can access the web based program at www.lungusa.org.    [place] Tobacco Users Help Line:  (807) 357-5050       Toll Free: 1-302.632.7096  Quit Tobacco Program [facility] (802)693-7880    Crisis Hotline:    [place] Crisis Hotline:  8-131-HIVBRDS or 1-501.353.6671    [place] Crisis Hotline:    1-611.535.3567 or 874-944-6986    Discharge Survey:   Thank you for choosing [facility]. We hope we did everything we could to make your hospital stay a pleasant one. You may be receiving a survey and we would appreciate your time and participation in answering the questions. Your input is very valuable to us in our efforts to improve our service to our patients and their families.            Signatures     My signature on this form indicates that:    1. I acknowledge receipt and understanding of these Home Care Instruction.  2. My questions regarding this information have been answered to my satisfaction.  3. I have formulated a plan with my discharge nurse to obtain my prescribed medications for home.    __________________________________      __________________________________                   Patient Signature                                 Guardian/Responsible Adult Signature      __________________________________                 __________       ________                       Nurse Signature                                               Date                 Time

## 2017-08-03 NOTE — OR SURGEON
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                                 Right nephrolithiasis with stone in lower pole moeity                                 Indwelling stent in lower pole moeity    PostOp Diagnosis: As above    Procedure(s):  RIGID CYSTOSCOPY WITH RIGHT STENT REMOVAL - Wound Class: Clean Contaminated  RIGHT FLEXIBLE URETEROSCOPY WITH - Wound Class: Clean Contaminated  LASER LITHOTRIPSY - Wound Class: Clean Contaminated    Surgeon(s):  Shahab Haile M.D.    Anesthesiologist/Type of Anesthesia:  Anesthesiologist: Basilio Bryan M.D./General lma    Surgical Staff:  Circulator: Tanja Palomo R.N.  Laser Staff: Ted Natarajan Circulator: Guera Fung R.N.  Scrub Person: Amy Brenda    Specimens:A: Right renal stone    Estimated Blood Loss: N/A    Findings: Stone in the lower pole of duplicated system. Unable to treat with laser due to angulation issues so remove with a stone basket as a stent had been in place for several weeks and the ureter was dilated.    Complications: None        8/3/2017 4:35 PM Shahab Haile

## 2017-08-04 NOTE — DISCHARGE INSTRUCTIONS
ACTIVITY: Rest and take it easy for the first 24 hours.  A responsible adult is recommended to remain with you during that time.  It is normal to feel sleepy.  We encourage you to not do anything that requires balance, judgment or coordination.    MILD FLU-LIKE SYMPTOMS ARE NORMAL. YOU MAY EXPERIENCE GENERALIZED MUSCLE ACHES, THROAT IRRITATION, HEADACHE AND/OR SOME NAUSEA.    FOR 24 HOURS DO NOT:  Drive, operate machinery or run household appliances.  Drink beer or alcoholic beverages.   Make important decisions or sign legal documents.    SPECIAL INSTRUCTIONS: Cystoscopy, Care After  Refer to this sheet in the next few weeks. These instructions provide you with information on caring for yourself after your procedure. Your caregiver may also give you more specific instructions. Your treatment has been planned according to current medical practices, but problems sometimes occur. Call your caregiver if you have any problems or questions after your procedure.  HOME CARE INSTRUCTIONS   Things you can do to ease any discomfort after your procedure include:  · Drinking enough water and fluids to keep your urine clear or pale yellow.  · Taking a warm bath to relieve any burning feelings.  SEEK IMMEDIATE MEDICAL CARE IF:   · You have an increase in blood in your urine.  · You notice blood clots in your urine.  · You have difficulty passing urine.  · You have the chills.  · You have abdominal pain.  · You have a fever or persistent symptoms for more than 2-3 days.  · You have a fever and your symptoms suddenly get worse.  MAKE SURE YOU:   · Understand these instructions.  · Will watch your condition.  · Will get help right away if you are not doing well or get worse.     This information is not intended to replace advice given to you by your health care provider. Make sure you discuss any questions you have with your health care provider.    Lithotripsy, Care After  Refer to this sheet in the next few weeks. These  instructions provide you with information on caring for yourself after your procedure. Your health care provider may also give you more specific instructions. Your treatment has been planned according to current medical practices, but problems sometimes occur. Call your health care provider if you have any problems or questions after your procedure.  WHAT TO EXPECT AFTER THE PROCEDURE   · Your urine may have a red tinge for a few days after treatment. Blood loss is usually minimal.  · You may have soreness in the back or flank area. This usually goes away after a few days. The procedure can cause blotches or bruises on the back where the pressure wave enters the skin. These marks usually cause only minimal discomfort and should disappear in a short time.  · Stone fragments should begin to pass within 24 hours of treatment. However, a delayed passage is not unusual.  · You may have pain, discomfort, and feel sick to your stomach (nauseated) when the crushed fragments of stone are passed down the tube from the kidney to the bladder. Stone fragments can pass soon after the procedure and may last for up to 4-8 weeks.  · A small number of patients may have severe pain when stone fragments are not able to pass, which leads to an obstruction.  · If your stone is greater than 1 inch (2.5 cm) in diameter or if you have multiple stones that have a combined diameter greater than 1 inch (2.5 cm), you may require more than one treatment.  · If you had a stent placed prior to your procedure, you may experience some discomfort, especially during urination. You may experience the pain or discomfort in your flank or back, or you may experience a sharp pain or discomfort at the base of your penis or in your lower abdomen. The discomfort usually lasts only a few minutes after urinating.  HOME CARE INSTRUCTIONS   · Rest at home until you feel your energy improving.  · Only take over-the-counter or prescription medicines for pain,  "discomfort, or fever as directed by your health care provider. Depending on the type of lithotripsy, you may need to take antibiotics and anti-inflammatory medicines for a few days.  · Drink enough water and fluids to keep your urine clear or pale yellow. This helps \"flush\" your kidneys. It helps pass any remaining pieces of stone and prevents stones from coming back.  · Most people can resume daily activities within 1-2 days after standard lithotripsy. It can take longer to recover from laser and percutaneous lithotripsy.  · If the stones are in your urinary system, you may be asked to strain your urine at home to look for stones. Any stones that are found can be sent to a medical lab for examination.  · Visit your health care provider for a follow-up appointment in a few weeks. Your doctor may remove your stent if you have one. Your health care provider will also check to see whether stone particles still remain.  SEEK MEDICAL CARE IF:   · Your pain is not relieved by medicine.  · You have a lasting nauseous feeling.  · You feel there is too much blood in the urine.  · You develop persistent problems with frequent or painful urination that does not at least partially improve after 2 days following the procedure.  · You have a congested cough.  · You feel lightheaded.  · You develop a rash or any other signs that might suggest an allergic problem.  · You develop any reaction or side effects to your medicine(s).  SEEK IMMEDIATE MEDICAL CARE IF:   · You experience severe back or flank pain or both.  · You see nothing but blood when you urinate.  · You cannot pass any urine at all.  · You have a fever or shaking chills.  · You develop shortness of breath, difficulty breathing, or chest pain.  · You develop vomiting that will not stop after 6-8 hours.  · You have a fainting episode.     This information is not intended to replace advice given to you by your health care provider. Make sure you discuss any questions you " have with your health care provider.              DIET: To avoid nausea, slowly advance diet as tolerated, avoiding spicy or greasy foods for the first day.  Add more substantial food to your diet according to your physician's instructions.  Babies can be fed formula or breast milk as soon as they are hungry.  INCREASE FLUIDS AND FIBER TO AVOID CONSTIPATION.      FOLLOW-UP APPOINTMENT:  A follow-up appointment should be arranged with your doctor in 6 weeks call to schedule.    You should CALL YOUR PHYSICIAN if you develop:  Fever greater than 101 degrees F.  Pain not relieved by medication, or persistent nausea or vomiting.  Excessive bleeding (blood soaking through dressing) or unexpected drainage from the wound.  Extreme redness or swelling around the incision site, drainage of pus or foul smelling drainage.  Inability to urinate or empty your bladder within 8 hours.  Problems with breathing or chest pain.    You should call [phone] if you develop problems with breathing or chest pain.  If you are unable to contact your doctor or surgical center, you should go to the nearest emergency room or urgent care center.  Physician's telephone #:[phone].    If any questions arise, call your doctor.  If your doctor is not available, please feel free to call the Surgical Center at [phone].  The Center is open Monday through Friday from 7AM to 7PM.  You can also call the [facility] HOTLINE open 24 hours/day, 7 days/week and speak to a nurse at [phone], or toll free at [phone].    A registered nurse may call you a few days after your surgery to see how you are doing after your procedure.    MEDICATIONS: Resume taking daily medication.  Take prescribed pain medication with food.  If no medication is prescribed, you may take non-aspirin pain medication if needed.  PAIN MEDICATION CAN BE VERY CONSTIPATING.  Take a stool softener or laxative such as senokot, pericolace, or milk of magnesia if needed.     Last pain  medication given at 4:45 PM.    If your physician has prescribed pain medication that includes Acetaminophen (Tylenol), do not take additional Acetaminophen (Tylenol) while taking the prescribed medication.    Depression / Suicide Risk    As you are discharged from this [facility] facility, it is important to learn how to keep safe from harming yourself.    Recognize the warning signs:  · Abrupt changes in personality, positive or negative- including increase in energy   · Giving away possessions  · Change in eating patterns- significant weight changes-  positive or negative  · Change in sleeping patterns- unable to sleep or sleeping all the time   · Unwillingness or inability to communicate  · Depression  · Unusual sadness, discouragement and loneliness  · Talk of wanting to die  · Neglect of personal appearance   · Rebelliousness- reckless behavior  · Withdrawal from people/activities they love  · Confusion- inability to concentrate     If you or a loved one observes any of these behaviors or has concerns about self-harm, here's what you can do:  · Talk about it- your feelings and reasons for harming yourself  · Remove any means that you might use to hurt yourself (examples: pills, rope, extension cords, firearm)  · Get professional help from the community (Mental Health, Substance Abuse, psychological counseling)  · Do not be alone:Call your Safe Contact- someone whom you trust who will be there for you.  · Call your local CRISIS HOTLINE 197-4483 or 362-880-0767  · Call your local Children's Mobile Crisis Response Team [place] (902) 392-8209 or www.[employer]  · Call the toll free National Suicide Prevention Hotlines   · National Suicide Prevention Lifeline 326-883-FLRZ (9947)  · National Hope Line Network 800-SUICIDE (358-2133)

## 2017-08-04 NOTE — OR NURSING
Pt A&O. VSS. Reports pain is tolerable and denies nausea. Pt ambulated to the BR and voided 350.   Report called to PACU II and pt transferred via gurney.

## 2017-08-04 NOTE — OP REPORT
DATE OF SERVICE:  08/03/2017    PREOPERATIVE DIAGNOSES:  1.  Incompletely duplicated right renal system.  2.  Indwelling right ureteral stent.  3.  History of obstructing 6 mm right ureteral stone in the lower pole moiety   of the incompletely duplicated right system.    PROCEDURES PERFORMED:  1.  Rigid cystourethroscopy.  2.  Right stent removal.  3.  Right flexible ureteroscopy with holmium laser lithotripsy and stone   basketing of 5-6 mm stone out of the lower pole segment.    SURGEON:  Shahab Haile MD    ANESTHESIOLOGIST:  Basilio Bryan MD    ANESTHESIA:  General laryngeal mass.    POSTOPERATIVE DIAGNOSES:  1.  Incompletely duplicated right renal system.  2.  Indwelling right ureteral stent.  3.  History of obstructing 6 mm right ureteral stone in the lower pole moiety   of the incompletely duplicated right system.    COMPLICATIONS:  None.    DRAINS:  None.    SPECIMENS:  6-mm blackish stone to pathology for chemical composition   analysis.    INDICATIONS FOR PROCEDURE:  This is a 56-year-old gentleman with a history of   right flank pain who has recurrent nephrolithiasis.  He underwent a procedure   at [facility] where he was identified to have an   incompletely duplicated right system.  I could not treat the stone that day   due to technical considerations and accessing the smaller lower pole ureteral   segment.  Therefore, a guidewire was ultimately manipulated in that segment   and the stent has been left in place for several weeks.  Prior to this   surgery, I discussed with the patient the risk of this procedure including but   not limited to risk of perioperative urinary tract infection, urosepsis, risk   of delayed urethral stricture with manipulation as well as the risk of need   for secondary procedures.  With ureteroscopy, there is certainly a risk of   ureteral perforation and there is a potential need for repositioning a stent   with a subsequent risk of stent pain, urgency,  frequency or migration.  In   addition, he is aware of the perioperative risk of deep vein thrombosis,   pulmonary embolism, aspiration pneumonia and death.  Informed consent has been   given to me by the patient to proceed, and he was marked in the preoperative   holding area on his right thigh with my initials and the letter Y.    DESCRIPTION IN DETAIL:  After informed consent was obtained, the patient was   brought to operating room and placed in supine.  Bilateral sequential   compression devices were in place and are activated.  A general laryngeal mask   anesthetic was administered in a balanced fashion.  Patient received   intravenous antibiotics, positioned in modified lithotomy and the operative   area was Betadine prepped and draped in usual sterile fashion.  All members of   the operative team agreed as the patient's name, procedure to be performed   without objections, attention was directed to the procedure.    I began the procedure by passing a generously lubricated 21-Cambodian rigid   cystoscope per urethra.  The anterior urethra was normal.  Prostatic fossa   measured 4.5 cm in length with bilobar subocclusive hypertrophy.  Upon   entering the bladder, serial evaluation shows a normal bladder.  Left ureteral   orifice is orthotopic.  Right ureteral orifice has a stent emanating from it   and was quite dilated.  I passed a 0.35 zip wire into the right ureteral   orifice and pushed it up and it went right next to the stent.  I therefore   used a _____ grasping forceps and withdrew the stent leaving the guidewire in   the lower pole moiety.  Over the wire, I passed a ureteral access sheath,   which was a 12-Cambodian tapered sheath just into the distal ureteral orifice and   then I pushed the ureteroscope up all the way into the kidney.  The stone was   visualized in the middle pole torri.  I attempted to treat, and I was able to   laser at a frequency of 5 Hz and 600 millijoules, only 2 shocks were given    and then rolled into a lower pole torri with difficult technical angles.    Therefore, I used a 0 tip basket.  I basketed the stone, put in the upper pole   of torri.  When I tried to treat in this ligation, again angulation made this   difficult, it is a lower pole moiety.  I therefore just basketed the stone as   the patient has a stent and I was able to withdraw very easily through the   lower pole moiety ureter into the common ureter and then withdrew it all the   way out to the distal ureter.  I removed the ureteral access sheath and was   able to remove it through the distal ureter without any resistance whatsoever   and the stone was subsequently submitted to pathology for permanent section.    At this point in time, I repassed the cystoscope into the bladder and   evaluated the right ureteral orifice which had clear efflux with small amount   of blood, but it was clearly draining and because this was a very atraumatic   procedure with minimal bleeding, I elected to not position the stent.  The   bladder was drained, tolerated the procedure well without complication, was   awakened in the operating room and transferred to recovery room where he   arrived in stable condition.       ____________________________________     MD VONNIE ROLDAN / SHANNON    DD:  08/04/2017 06:33:52  DT:  08/04/2017 07:05:12    D#:  [id number]  Job#:  [id number]

## 2017-08-08 LAB
APPEARANCE STONE: NORMAL
COMPN STONE: NORMAL
NUMBER STONE: 1
SIZE STONE: NORMAL MM
SPECIMEN WT: 56 MG

## 2018-08-08 ENCOUNTER — APPOINTMENT (RX ONLY)
Dept: URBAN - METROPOLITAN AREA CLINIC 4 | Facility: CLINIC | Age: 58
Setting detail: DERMATOLOGY
End: 2018-08-08

## 2018-08-08 DIAGNOSIS — L73.9 FOLLICULAR DISORDER, UNSPECIFIED: ICD-10-CM

## 2018-08-08 DIAGNOSIS — D22 MELANOCYTIC NEVI: ICD-10-CM

## 2018-08-08 DIAGNOSIS — D18.0 HEMANGIOMA: ICD-10-CM

## 2018-08-08 DIAGNOSIS — L82.1 OTHER SEBORRHEIC KERATOSIS: ICD-10-CM

## 2018-08-08 DIAGNOSIS — L663 OTHER SPECIFIED DISEASES OF HAIR AND HAIR FOLLICLES: ICD-10-CM

## 2018-08-08 DIAGNOSIS — B35.1 TINEA UNGUIUM: ICD-10-CM

## 2018-08-08 DIAGNOSIS — L71.1 RHINOPHYMA: ICD-10-CM

## 2018-08-08 DIAGNOSIS — L738 OTHER SPECIFIED DISEASES OF HAIR AND HAIR FOLLICLES: ICD-10-CM

## 2018-08-08 DIAGNOSIS — L81.4 OTHER MELANIN HYPERPIGMENTATION: ICD-10-CM

## 2018-08-08 DIAGNOSIS — L81.0 POSTINFLAMMATORY HYPERPIGMENTATION: ICD-10-CM

## 2018-08-08 PROBLEM — L02.222 FURUNCLE OF BACK [ANY PART, EXCEPT BUTTOCK]: Status: ACTIVE | Noted: 2018-08-08

## 2018-08-08 PROBLEM — D22.5 MELANOCYTIC NEVI OF TRUNK: Status: ACTIVE | Noted: 2018-08-08

## 2018-08-08 PROBLEM — L02.12 FURUNCLE OF NECK: Status: ACTIVE | Noted: 2018-08-08

## 2018-08-08 PROBLEM — L02.223 FURUNCLE OF CHEST WALL: Status: ACTIVE | Noted: 2018-08-08

## 2018-08-08 PROBLEM — D23.71 OTHER BENIGN NEOPLASM OF SKIN OF RIGHT LOWER LIMB, INCLUDING HIP: Status: ACTIVE | Noted: 2018-08-08

## 2018-08-08 PROBLEM — D18.01 HEMANGIOMA OF SKIN AND SUBCUTANEOUS TISSUE: Status: ACTIVE | Noted: 2018-08-08

## 2018-08-08 PROCEDURE — ? COUNSELING

## 2018-08-08 PROCEDURE — 99203 OFFICE O/P NEW LOW 30 MIN: CPT

## 2018-08-08 PROCEDURE — ? ADDITIONAL NOTES

## 2018-08-08 ASSESSMENT — LOCATION ZONE DERM
LOCATION ZONE: HAND
LOCATION ZONE: LEG
LOCATION ZONE: NOSE
LOCATION ZONE: TRUNK
LOCATION ZONE: NECK

## 2018-08-08 ASSESSMENT — LOCATION SIMPLE DESCRIPTION DERM
LOCATION SIMPLE: RIGHT UPPER BACK
LOCATION SIMPLE: LEFT THIGH
LOCATION SIMPLE: LOWER BACK
LOCATION SIMPLE: CHEST
LOCATION SIMPLE: LEFT ANTERIOR NECK
LOCATION SIMPLE: RIGHT HAND
LOCATION SIMPLE: RIGHT BUTTOCK
LOCATION SIMPLE: NOSE
LOCATION SIMPLE: LEFT BUTTOCK
LOCATION SIMPLE: RIGHT PRETIBIAL REGION

## 2018-08-08 ASSESSMENT — LOCATION DETAILED DESCRIPTION DERM
LOCATION DETAILED: LEFT BUTTOCK
LOCATION DETAILED: LEFT MEDIAL INFERIOR CHEST
LOCATION DETAILED: STERNUM
LOCATION DETAILED: RIGHT BUTTOCK
LOCATION DETAILED: NASAL DORSUM
LOCATION DETAILED: RIGHT RADIAL DORSAL HAND
LOCATION DETAILED: LEFT SUPERIOR ANTERIOR NECK
LOCATION DETAILED: RIGHT MEDIAL PROXIMAL PRETIBIAL REGION
LOCATION DETAILED: LEFT ANTERIOR PROXIMAL THIGH
LOCATION DETAILED: SUPERIOR LUMBAR SPINE
LOCATION DETAILED: RIGHT MEDIAL UPPER BACK

## 2018-08-08 NOTE — PROCEDURE: ADDITIONAL NOTES
Additional Notes: The patient declined a topical rx today.
Additional Notes: Discussed in depth the diagnosis and treatment options available

## 2018-08-08 NOTE — HPI: INFECTION (ONYCHOMYCOSIS)
How Severe Is It?: severe
Is This A New Presentation, Or A Follow-Up?: Nail Infection
Additional History: The patient is not wanting to use oral treatments

## 2018-08-08 NOTE — HPI: RASH (ROSACEA)
How Severe Is Your Rosacea?: moderate
Is This A New Presentation, Or A Follow-Up?: Rosacea
Additional History: The patient was diagnosed with Rhinophyma and takes MCN. He would like to discuss treatment options and make sure that he does not become disfiguring.

## 2018-08-08 NOTE — HPI: SKIN LESION
Is This A New Presentation, Or A Follow-Up?: Skin Lesion
Has Your Skin Lesion Been Treated?: not been treated
Additional History: The lesion is linear and red. The patient states no trauma involved.

## 2019-01-22 NOTE — ED NOTES
"Chief Complaint   Patient presents with   • N/V   • Flank Pain     right side, started yesterday morning, hx kidney stones       Denies fevers    /81 mmHg  Pulse 65  Temp(Src) 36.9 °C (98.4 °F)  Resp 16  Ht 1.727 m (5' 7.99\")  Wt 73.5 kg (162 lb 0.6 oz)  BMI 24.64 kg/m2    "
Discharge information provided. Pt verbalized understanding of discharge instructions to follow up with Urology and to return to ER if condition worsens. Pt expressed the awareness of not driving or operating heavy machinery, has ride home with wife. Pt ambulated out of ER in a steady gait, no additional questions or concerns. Paper scripts given. Educated on new medications.   
ERP at  reviewing results with patient.   
IV established. Blood sent to lab.  
Pt unable to urinate at this time, urinal at BS. PT to CT via eyal   
Urine sent to lab  
yes

## 2020-09-22 ENCOUNTER — APPOINTMENT (RX ONLY)
Dept: URBAN - METROPOLITAN AREA CLINIC 4 | Facility: CLINIC | Age: 60
Setting detail: DERMATOLOGY
End: 2020-09-22

## 2020-09-22 DIAGNOSIS — D22 MELANOCYTIC NEVI: ICD-10-CM

## 2020-09-22 DIAGNOSIS — D18.0 HEMANGIOMA: ICD-10-CM

## 2020-09-22 DIAGNOSIS — L57.8 OTHER SKIN CHANGES DUE TO CHRONIC EXPOSURE TO NONIONIZING RADIATION: ICD-10-CM

## 2020-09-22 DIAGNOSIS — D485 NEOPLASM OF UNCERTAIN BEHAVIOR OF SKIN: ICD-10-CM

## 2020-09-22 DIAGNOSIS — L50.3 DERMATOGRAPHIC URTICARIA: ICD-10-CM

## 2020-09-22 DIAGNOSIS — L50.1 IDIOPATHIC URTICARIA: ICD-10-CM

## 2020-09-22 DIAGNOSIS — L71.8 OTHER ROSACEA: ICD-10-CM

## 2020-09-22 DIAGNOSIS — L81.4 OTHER MELANIN HYPERPIGMENTATION: ICD-10-CM

## 2020-09-22 DIAGNOSIS — L82.1 OTHER SEBORRHEIC KERATOSIS: ICD-10-CM

## 2020-09-22 PROBLEM — D18.01 HEMANGIOMA OF SKIN AND SUBCUTANEOUS TISSUE: Status: ACTIVE | Noted: 2020-09-22

## 2020-09-22 PROBLEM — D22.5 MELANOCYTIC NEVI OF TRUNK: Status: ACTIVE | Noted: 2020-09-22

## 2020-09-22 PROBLEM — D48.5 NEOPLASM OF UNCERTAIN BEHAVIOR OF SKIN: Status: ACTIVE | Noted: 2020-09-22

## 2020-09-22 PROCEDURE — 11102 TANGNTL BX SKIN SINGLE LES: CPT

## 2020-09-22 PROCEDURE — ? COUNSELING

## 2020-09-22 PROCEDURE — 99214 OFFICE O/P EST MOD 30 MIN: CPT | Mod: 25

## 2020-09-22 PROCEDURE — ? DEFER

## 2020-09-22 PROCEDURE — ? ADDITIONAL NOTES

## 2020-09-22 PROCEDURE — ? PHOTO-DOCUMENTATION

## 2020-09-22 PROCEDURE — ? PRESCRIPTION

## 2020-09-22 PROCEDURE — ? PATIENT SPECIFIC COUNSELING

## 2020-09-22 PROCEDURE — 11103 TANGNTL BX SKIN EA SEP/ADDL: CPT

## 2020-09-22 PROCEDURE — ? BIOPSY BY SHAVE METHOD

## 2020-09-22 RX ORDER — MINOCYCLINE HYDROCHLORIDE 100 MG/1
CAPSULE ORAL BID
Qty: 60 | Refills: 3 | Status: ERX

## 2020-09-22 RX ORDER — METRONIDAZOLE 10 MG/G
GEL TOPICAL BID
Qty: 1 | Refills: 3 | Status: ERX | COMMUNITY
Start: 2020-09-22

## 2020-09-22 RX ADMIN — METRONIDAZOLE: 10 GEL TOPICAL at 00:00

## 2020-09-22 ASSESSMENT — LOCATION SIMPLE DESCRIPTION DERM
LOCATION SIMPLE: LEFT CHEEK
LOCATION SIMPLE: CHEST
LOCATION SIMPLE: LEFT UPPER ARM
LOCATION SIMPLE: RIGHT UPPER BACK
LOCATION SIMPLE: LEFT POPLITEAL SKIN
LOCATION SIMPLE: RIGHT THIGH
LOCATION SIMPLE: RIGHT CHEEK
LOCATION SIMPLE: NOSE
LOCATION SIMPLE: LEFT THIGH
LOCATION SIMPLE: RIGHT UPPER ARM
LOCATION SIMPLE: RIGHT FOREARM
LOCATION SIMPLE: LEFT UPPER BACK
LOCATION SIMPLE: LEFT FOREARM

## 2020-09-22 ASSESSMENT — LOCATION ZONE DERM
LOCATION ZONE: FACE
LOCATION ZONE: ARM
LOCATION ZONE: TRUNK
LOCATION ZONE: LEG
LOCATION ZONE: NOSE

## 2020-09-22 ASSESSMENT — LOCATION DETAILED DESCRIPTION DERM
LOCATION DETAILED: RIGHT ANTERIOR DISTAL THIGH
LOCATION DETAILED: LEFT ANTERIOR DISTAL THIGH
LOCATION DETAILED: LEFT MEDIAL SUPERIOR CHEST
LOCATION DETAILED: LEFT POPLITEAL SKIN
LOCATION DETAILED: RIGHT PROXIMAL DORSAL FOREARM
LOCATION DETAILED: RIGHT DISTAL DORSAL FOREARM
LOCATION DETAILED: LEFT SUPERIOR UPPER BACK
LOCATION DETAILED: NASAL TIP
LOCATION DETAILED: RIGHT CENTRAL MALAR CHEEK
LOCATION DETAILED: LEFT INFERIOR CENTRAL MALAR CHEEK
LOCATION DETAILED: RIGHT MID-UPPER BACK
LOCATION DETAILED: LEFT DISTAL DORSAL FOREARM
LOCATION DETAILED: LEFT ANTERIOR PROXIMAL UPPER ARM
LOCATION DETAILED: RIGHT INFERIOR UPPER BACK
LOCATION DETAILED: RIGHT SUPERIOR MEDIAL UPPER BACK
LOCATION DETAILED: RIGHT ANTERIOR PROXIMAL UPPER ARM

## 2020-09-22 NOTE — PROCEDURE: DEFER
Detail Level: Detailed
Introduction Text (Please End With A Colon): The following procedure was deferred:  Will set up surgery appt.  Biopsy was done at another facility  ([facility])

## 2020-11-05 ENCOUNTER — APPOINTMENT (RX ONLY)
Dept: URBAN - METROPOLITAN AREA CLINIC 4 | Facility: CLINIC | Age: 60
Setting detail: DERMATOLOGY
End: 2020-11-05

## 2020-11-05 DIAGNOSIS — T81.89 OTHER COMPLICATIONS OF PROCEDURES, NOT ELSEWHERE CLASSIFIED: ICD-10-CM

## 2020-11-05 PROBLEM — T81.89XA OTHER COMPLICATIONS OF PROCEDURES, NOT ELSEWHERE CLASSIFIED, INITIAL ENCOUNTER: Status: ACTIVE | Noted: 2020-11-05

## 2020-11-05 PROCEDURE — ? POST-OP WOUND EVALUATION

## 2020-11-05 PROCEDURE — ? ADDITIONAL NOTES

## 2020-11-05 PROCEDURE — 99212 OFFICE O/P EST SF 10 MIN: CPT

## 2020-11-05 ASSESSMENT — LOCATION DETAILED DESCRIPTION DERM: LOCATION DETAILED: RIGHT PROXIMAL RADIAL DORSAL FOREARM

## 2020-11-05 ASSESSMENT — LOCATION ZONE DERM: LOCATION ZONE: ARM

## 2020-11-05 ASSESSMENT — LOCATION SIMPLE DESCRIPTION DERM: LOCATION SIMPLE: RIGHT FOREARM

## 2020-11-05 NOTE — PROCEDURE: ADDITIONAL NOTES
Detail Level: Simple
Additional Notes: Would irritated do to long term use of adhesive \\nPatient instructed to use OTC cortisone ointment \\nD/C bacitracin

## 2020-11-05 NOTE — PROCEDURE: POST-OP WOUND EVALUATION
Detail Level: Detailed
Wound Evaluated By (Optional): Dr. Beaulieu
Wound Diameter In Cm(Optional): 0
Wound Crusting?: clean
Wound Color?: red

## 2020-11-23 NOTE — DISCHARGE INSTRUCTIONS
Kidney Stones  Kidney stones (urolithiasis) are deposits that form inside your kidneys. The intense pain is caused by the stone moving through the urinary tract. When the stone moves, the ureter goes into spasm around the stone. The stone is usually passed in the urine.   CAUSES   · A disorder that makes certain neck glands produce too much parathyroid hormone (primary hyperparathyroidism).  · A buildup of uric acid crystals, similar to gout in your joints.  · Narrowing (stricture) of the ureter.  · A kidney obstruction present at birth (congenital obstruction).  · Previous surgery on the kidney or ureters.  · Numerous kidney infections.  SYMPTOMS   · Feeling sick to your stomach (nauseous).  · Throwing up (vomiting).  · Blood in the urine (hematuria).  · Pain that usually spreads (radiates) to the groin.  · Frequency or urgency of urination.  DIAGNOSIS   · Taking a history and physical exam.  · Blood or urine tests.  · CT scan.  · Occasionally, an examination of the inside of the urinary bladder (cystoscopy) is performed.  TREATMENT   · Observation.  · Increasing your fluid intake.  · Extracorporeal shock wave lithotripsy--This is a noninvasive procedure that uses shock waves to break up kidney stones.  · Surgery may be needed if you have severe pain or persistent obstruction. There are various surgical procedures. Most of the procedures are performed with the use of small instruments. Only small incisions are needed to accommodate these instruments, so recovery time is minimized.  The size, location, and chemical composition are all important variables that will determine the proper choice of action for you. Talk to your health care provider to better understand your situation so that you will minimize the risk of injury to yourself and your kidney.   HOME CARE INSTRUCTIONS   · Drink enough water and fluids to keep your urine clear or pale yellow. This will help you to pass the stone or stone fragments.  · Strain  all urine through the provided strainer. Keep all particulate matter and stones for your health care provider to see. The stone causing the pain may be as small as a grain of salt. It is very important to use the strainer each and every time you pass your urine. The collection of your stone will allow your health care provider to analyze it and verify that a stone has actually passed. The stone analysis will often identify what you can do to reduce the incidence of recurrences.  · Only take over-the-counter or prescription medicines for pain, discomfort, or fever as directed by your health care provider.  · Make a follow-up appointment with your health care provider as directed.  · Get follow-up X-rays if required. The absence of pain does not always mean that the stone has passed. It may have only stopped moving. If the urine remains completely obstructed, it can cause loss of kidney function or even complete destruction of the kidney. It is your responsibility to make sure X-rays and follow-ups are completed. Ultrasounds of the kidney can show blockages and the status of the kidney. Ultrasounds are not associated with any radiation and can be performed easily in a matter of minutes.  SEEK MEDICAL CARE IF:  · You experience pain that is progressive and unresponsive to any pain medicine you have been prescribed.  SEEK IMMEDIATE MEDICAL CARE IF:   · Pain cannot be controlled with the prescribed medicine.  · You have a fever or shaking chills.  · The severity or intensity of pain increases over 18 hours and is not relieved by pain medicine.  · You develop a new onset of abdominal pain.  · You feel faint or pass out.  · You are unable to urinate.  MAKE SURE YOU:   · Understand these instructions.  · Will watch your condition.  · Will get help right away if you are not doing well or get worse.     This information is not intended to replace advice given to you by your health care provider. Make sure you discuss any  questions you have with your health care provider.     Document Released: 12/18/2006 Document Revised: 01/08/2016 Document Reviewed: 05/21/2014  [employer] Interactive Patient Education ©2016 [employer]     Referred To Mid-Level For Closure Text (Leave Blank If You Do Not Want): After obtaining clear surgical margins the patient was sent to a mid-level provider for surgical repair.  The patient understands they will receive post-surgical care and follow-up from the mid-level provider.

## 2021-02-05 ENCOUNTER — HOSPITAL ENCOUNTER (OUTPATIENT)
Dept: HOSPITAL 8 - RAD | Age: 61
Discharge: HOME | End: 2021-02-05
Attending: PHYSICIAN ASSISTANT
Payer: COMMERCIAL

## 2021-02-05 DIAGNOSIS — N13.30: ICD-10-CM

## 2021-02-05 DIAGNOSIS — N20.0: Primary | ICD-10-CM

## 2021-02-05 PROCEDURE — 74176 CT ABD & PELVIS W/O CONTRAST: CPT

## 2021-02-09 ENCOUNTER — APPOINTMENT (OUTPATIENT)
Dept: RADIOLOGY | Facility: MEDICAL CENTER | Age: 61
End: 2021-02-09
Attending: UROLOGY
Payer: COMMERCIAL

## 2021-02-09 ENCOUNTER — ANESTHESIA (OUTPATIENT)
Dept: SURGERY | Facility: MEDICAL CENTER | Age: 61
End: 2021-02-09
Payer: COMMERCIAL

## 2021-02-09 ENCOUNTER — HOSPITAL ENCOUNTER (OUTPATIENT)
Dept: RADIOLOGY | Facility: MEDICAL CENTER | Age: 61
End: 2021-02-09
Payer: COMMERCIAL

## 2021-02-09 ENCOUNTER — APPOINTMENT (OUTPATIENT)
Dept: RADIOLOGY | Facility: MEDICAL CENTER | Age: 61
End: 2021-02-09
Attending: EMERGENCY MEDICINE
Payer: COMMERCIAL

## 2021-02-09 ENCOUNTER — ANESTHESIA EVENT (OUTPATIENT)
Dept: SURGERY | Facility: MEDICAL CENTER | Age: 61
End: 2021-02-09
Payer: COMMERCIAL

## 2021-02-09 ENCOUNTER — HOSPITAL ENCOUNTER (OUTPATIENT)
Facility: MEDICAL CENTER | Age: 61
End: 2021-02-10
Attending: EMERGENCY MEDICINE | Admitting: STUDENT IN AN ORGANIZED HEALTH CARE EDUCATION/TRAINING PROGRAM
Payer: COMMERCIAL

## 2021-02-09 DIAGNOSIS — N20.1 URETEROLITHIASIS: ICD-10-CM

## 2021-02-09 DIAGNOSIS — R10.9 RIGHT FLANK PAIN: ICD-10-CM

## 2021-02-09 PROBLEM — N20.0 NEPHROLITHIASIS: Status: ACTIVE | Noted: 2021-02-09

## 2021-02-09 LAB
ANION GAP SERPL CALC-SCNC: 12 MMOL/L (ref 7–16)
APPEARANCE UR: CLEAR
BACTERIA #/AREA URNS HPF: NEGATIVE /HPF
BASOPHILS # BLD AUTO: 0.3 % (ref 0–1.8)
BASOPHILS # BLD: 0.03 K/UL (ref 0–0.12)
BILIRUB UR QL STRIP.AUTO: NEGATIVE
BUN SERPL-MCNC: 7 MG/DL (ref 8–22)
CALCIUM SERPL-MCNC: 9.3 MG/DL (ref 8.5–10.5)
CHLORIDE SERPL-SCNC: 100 MMOL/L (ref 96–112)
CO2 SERPL-SCNC: 24 MMOL/L (ref 20–33)
COLOR UR: YELLOW
CREAT SERPL-MCNC: 1.07 MG/DL (ref 0.5–1.4)
EOSINOPHIL # BLD AUTO: 0.03 K/UL (ref 0–0.51)
EOSINOPHIL NFR BLD: 0.3 % (ref 0–6.9)
EPI CELLS #/AREA URNS HPF: ABNORMAL /HPF
ERYTHROCYTE [DISTWIDTH] IN BLOOD BY AUTOMATED COUNT: 46.1 FL (ref 35.9–50)
GLUCOSE SERPL-MCNC: 134 MG/DL (ref 65–99)
GLUCOSE UR STRIP.AUTO-MCNC: NEGATIVE MG/DL
HCT VFR BLD AUTO: 41 % (ref 42–52)
HGB BLD-MCNC: 14 G/DL (ref 14–18)
IMM GRANULOCYTES # BLD AUTO: 0.06 K/UL (ref 0–0.11)
IMM GRANULOCYTES NFR BLD AUTO: 0.5 % (ref 0–0.9)
KETONES UR STRIP.AUTO-MCNC: ABNORMAL MG/DL
LEUKOCYTE ESTERASE UR QL STRIP.AUTO: ABNORMAL
LYMPHOCYTES # BLD AUTO: 0.75 K/UL (ref 1–4.8)
LYMPHOCYTES NFR BLD: 6.6 % (ref 22–41)
MCH RBC QN AUTO: 31.8 PG (ref 27–33)
MCHC RBC AUTO-ENTMCNC: 34.1 G/DL (ref 33.7–35.3)
MCV RBC AUTO: 93.2 FL (ref 81.4–97.8)
MICRO URNS: ABNORMAL
MONOCYTES # BLD AUTO: 0.69 K/UL (ref 0–0.85)
MONOCYTES NFR BLD AUTO: 6 % (ref 0–13.4)
MUCOUS THREADS #/AREA URNS HPF: ABNORMAL /HPF
NEUTROPHILS # BLD AUTO: 9.86 K/UL (ref 1.82–7.42)
NEUTROPHILS NFR BLD: 86.3 % (ref 44–72)
NITRITE UR QL STRIP.AUTO: NEGATIVE
NRBC # BLD AUTO: 0 K/UL
NRBC BLD-RTO: 0 /100 WBC
PH UR STRIP.AUTO: 5.5 [PH] (ref 5–8)
PLATELET # BLD AUTO: 209 K/UL (ref 164–446)
PMV BLD AUTO: 10.3 FL (ref 9–12.9)
POTASSIUM SERPL-SCNC: 4.2 MMOL/L (ref 3.6–5.5)
PROT UR QL STRIP: NEGATIVE MG/DL
RBC # BLD AUTO: 4.4 M/UL (ref 4.7–6.1)
RBC # URNS HPF: ABNORMAL /HPF
RBC UR QL AUTO: ABNORMAL
SARS-COV+SARS-COV-2 AG RESP QL IA.RAPID: NOTDETECTED
SARS-COV-2 RNA RESP QL NAA+PROBE: NOTDETECTED
SODIUM SERPL-SCNC: 136 MMOL/L (ref 135–145)
SP GR UR STRIP.AUTO: 1.02
SPECIMEN SOURCE: NORMAL
SPECIMEN SOURCE: NORMAL
UROBILINOGEN UR STRIP.AUTO-MCNC: 0.2 MG/DL
WBC # BLD AUTO: 11.4 K/UL (ref 4.8–10.8)
WBC #/AREA URNS HPF: ABNORMAL /HPF

## 2021-02-09 PROCEDURE — C1769 GUIDE WIRE: HCPCS | Performed by: UROLOGY

## 2021-02-09 PROCEDURE — 80048 BASIC METABOLIC PNL TOTAL CA: CPT

## 2021-02-09 PROCEDURE — U0003 INFECTIOUS AGENT DETECTION BY NUCLEIC ACID (DNA OR RNA); SEVERE ACUTE RESPIRATORY SYNDROME CORONAVIRUS 2 (SARS-COV-2) (CORONAVIRUS DISEASE [COVID-19]), AMPLIFIED PROBE TECHNIQUE, MAKING USE OF HIGH THROUGHPUT TECHNOLOGIES AS DESCRIBED BY CMS-2020-01-R: HCPCS

## 2021-02-09 PROCEDURE — 700111 HCHG RX REV CODE 636 W/ 250 OVERRIDE (IP): Performed by: EMERGENCY MEDICINE

## 2021-02-09 PROCEDURE — 160039 HCHG SURGERY MINUTES - EA ADDL 1 MIN LEVEL 3: Performed by: UROLOGY

## 2021-02-09 PROCEDURE — C9803 HOPD COVID-19 SPEC COLLECT: HCPCS | Performed by: EMERGENCY MEDICINE

## 2021-02-09 PROCEDURE — 96374 THER/PROPH/DIAG INJ IV PUSH: CPT

## 2021-02-09 PROCEDURE — 700111 HCHG RX REV CODE 636 W/ 250 OVERRIDE (IP): Performed by: ANESTHESIOLOGY

## 2021-02-09 PROCEDURE — 160002 HCHG RECOVERY MINUTES (STAT): Performed by: UROLOGY

## 2021-02-09 PROCEDURE — U0005 INFEC AGEN DETEC AMPLI PROBE: HCPCS

## 2021-02-09 PROCEDURE — 700102 HCHG RX REV CODE 250 W/ 637 OVERRIDE(OP): Performed by: ANESTHESIOLOGY

## 2021-02-09 PROCEDURE — G0378 HOSPITAL OBSERVATION PER HR: HCPCS

## 2021-02-09 PROCEDURE — C1758 CATHETER, URETERAL: HCPCS | Performed by: UROLOGY

## 2021-02-09 PROCEDURE — 74018 RADEX ABDOMEN 1 VIEW: CPT

## 2021-02-09 PROCEDURE — 160035 HCHG PACU - 1ST 60 MINS PHASE I: Performed by: UROLOGY

## 2021-02-09 PROCEDURE — 502240 HCHG MISC OR SUPPLY RC 0272: Performed by: UROLOGY

## 2021-02-09 PROCEDURE — A9270 NON-COVERED ITEM OR SERVICE: HCPCS | Performed by: ANESTHESIOLOGY

## 2021-02-09 PROCEDURE — 160009 HCHG ANES TIME/MIN: Performed by: UROLOGY

## 2021-02-09 PROCEDURE — 81001 URINALYSIS AUTO W/SCOPE: CPT

## 2021-02-09 PROCEDURE — 700105 HCHG RX REV CODE 258: Performed by: ANESTHESIOLOGY

## 2021-02-09 PROCEDURE — 87426 SARSCOV CORONAVIRUS AG IA: CPT

## 2021-02-09 PROCEDURE — 700105 HCHG RX REV CODE 258: Performed by: STUDENT IN AN ORGANIZED HEALTH CARE EDUCATION/TRAINING PROGRAM

## 2021-02-09 PROCEDURE — 160028 HCHG SURGERY MINUTES - 1ST 30 MINS LEVEL 3: Performed by: UROLOGY

## 2021-02-09 PROCEDURE — 99220 PR INITIAL OBSERVATION CARE,LEVL III: CPT | Performed by: STUDENT IN AN ORGANIZED HEALTH CARE EDUCATION/TRAINING PROGRAM

## 2021-02-09 PROCEDURE — 700105 HCHG RX REV CODE 258: Performed by: EMERGENCY MEDICINE

## 2021-02-09 PROCEDURE — 160036 HCHG PACU - EA ADDL 30 MINS PHASE I: Performed by: UROLOGY

## 2021-02-09 PROCEDURE — 500879 HCHG PACK, CYSTO: Performed by: UROLOGY

## 2021-02-09 PROCEDURE — 700101 HCHG RX REV CODE 250: Performed by: ANESTHESIOLOGY

## 2021-02-09 PROCEDURE — 700117 HCHG RX CONTRAST REV CODE 255: Performed by: UROLOGY

## 2021-02-09 PROCEDURE — 501329 HCHG SET, CYSTO IRRIG Y TUR: Performed by: UROLOGY

## 2021-02-09 PROCEDURE — 160048 HCHG OR STATISTICAL LEVEL 1-5: Performed by: UROLOGY

## 2021-02-09 PROCEDURE — 85025 COMPLETE CBC W/AUTO DIFF WBC: CPT

## 2021-02-09 PROCEDURE — 99285 EMERGENCY DEPT VISIT HI MDM: CPT

## 2021-02-09 RX ORDER — BISACODYL 10 MG
10 SUPPOSITORY, RECTAL RECTAL
Status: DISCONTINUED | OUTPATIENT
Start: 2021-02-09 | End: 2021-02-10 | Stop reason: HOSPADM

## 2021-02-09 RX ORDER — ONDANSETRON 2 MG/ML
4 INJECTION INTRAMUSCULAR; INTRAVENOUS
Status: COMPLETED | OUTPATIENT
Start: 2021-02-09 | End: 2021-02-09

## 2021-02-09 RX ORDER — SODIUM CHLORIDE, SODIUM LACTATE, POTASSIUM CHLORIDE, CALCIUM CHLORIDE 600; 310; 30; 20 MG/100ML; MG/100ML; MG/100ML; MG/100ML
INJECTION, SOLUTION INTRAVENOUS
Status: DISCONTINUED | OUTPATIENT
Start: 2021-02-09 | End: 2021-02-09 | Stop reason: SURG

## 2021-02-09 RX ORDER — ONDANSETRON 2 MG/ML
INJECTION INTRAMUSCULAR; INTRAVENOUS PRN
Status: DISCONTINUED | OUTPATIENT
Start: 2021-02-09 | End: 2021-02-09 | Stop reason: SURG

## 2021-02-09 RX ORDER — OMEPRAZOLE 20 MG/1
20 CAPSULE, DELAYED RELEASE ORAL DAILY
Status: DISCONTINUED | OUTPATIENT
Start: 2021-02-10 | End: 2021-02-10 | Stop reason: HOSPADM

## 2021-02-09 RX ORDER — CEFAZOLIN SODIUM 1 G/3ML
INJECTION, POWDER, FOR SOLUTION INTRAMUSCULAR; INTRAVENOUS PRN
Status: DISCONTINUED | OUTPATIENT
Start: 2021-02-09 | End: 2021-02-09 | Stop reason: SURG

## 2021-02-09 RX ORDER — LIDOCAINE HYDROCHLORIDE 20 MG/ML
INJECTION, SOLUTION EPIDURAL; INFILTRATION; INTRACAUDAL; PERINEURAL PRN
Status: DISCONTINUED | OUTPATIENT
Start: 2021-02-09 | End: 2021-02-09 | Stop reason: SURG

## 2021-02-09 RX ORDER — LAMOTRIGINE 100 MG/1
200 TABLET ORAL EVERY MORNING
Status: DISCONTINUED | OUTPATIENT
Start: 2021-02-10 | End: 2021-02-10 | Stop reason: HOSPADM

## 2021-02-09 RX ORDER — OMEPRAZOLE 20 MG/1
20 CAPSULE, DELAYED RELEASE ORAL DAILY
COMMUNITY

## 2021-02-09 RX ORDER — FINASTERIDE 1 MG/1
1 TABLET, FILM COATED ORAL DAILY
COMMUNITY

## 2021-02-09 RX ORDER — DIPHENHYDRAMINE HYDROCHLORIDE 50 MG/ML
12.5 INJECTION INTRAMUSCULAR; INTRAVENOUS
Status: DISCONTINUED | OUTPATIENT
Start: 2021-02-09 | End: 2021-02-09 | Stop reason: HOSPADM

## 2021-02-09 RX ORDER — KETOROLAC TROMETHAMINE 30 MG/ML
30 INJECTION, SOLUTION INTRAMUSCULAR; INTRAVENOUS ONCE
Status: COMPLETED | OUTPATIENT
Start: 2021-02-09 | End: 2021-02-09

## 2021-02-09 RX ORDER — OXYCODONE HYDROCHLORIDE AND ACETAMINOPHEN 5; 325 MG/1; MG/1
1 TABLET ORAL EVERY 6 HOURS PRN
Qty: 10 TAB | Refills: 0 | Status: SHIPPED | OUTPATIENT
Start: 2021-02-09 | End: 2021-02-12

## 2021-02-09 RX ORDER — FINASTERIDE 5 MG/1
5 TABLET, FILM COATED ORAL DAILY
Status: DISCONTINUED | OUTPATIENT
Start: 2021-02-10 | End: 2021-02-10 | Stop reason: HOSPADM

## 2021-02-09 RX ORDER — KETOROLAC TROMETHAMINE 30 MG/ML
INJECTION, SOLUTION INTRAMUSCULAR; INTRAVENOUS PRN
Status: DISCONTINUED | OUTPATIENT
Start: 2021-02-09 | End: 2021-02-09 | Stop reason: HOSPADM

## 2021-02-09 RX ORDER — SODIUM CHLORIDE 9 MG/ML
INJECTION, SOLUTION INTRAVENOUS CONTINUOUS
Status: DISCONTINUED | OUTPATIENT
Start: 2021-02-09 | End: 2021-02-10

## 2021-02-09 RX ORDER — METOCLOPRAMIDE HYDROCHLORIDE 5 MG/ML
INJECTION INTRAMUSCULAR; INTRAVENOUS PRN
Status: DISCONTINUED | OUTPATIENT
Start: 2021-02-09 | End: 2021-02-09 | Stop reason: SURG

## 2021-02-09 RX ORDER — ROSUVASTATIN CALCIUM 10 MG/1
10 TABLET, COATED ORAL EVERY EVENING
COMMUNITY

## 2021-02-09 RX ORDER — ACETAMINOPHEN 325 MG/1
650 TABLET ORAL EVERY 6 HOURS PRN
Status: DISCONTINUED | OUTPATIENT
Start: 2021-02-09 | End: 2021-02-10 | Stop reason: HOSPADM

## 2021-02-09 RX ORDER — HALOPERIDOL 5 MG/ML
1 INJECTION INTRAMUSCULAR
Status: DISCONTINUED | OUTPATIENT
Start: 2021-02-09 | End: 2021-02-09 | Stop reason: HOSPADM

## 2021-02-09 RX ORDER — HYDROCODONE BITARTRATE AND ACETAMINOPHEN 10; 325 MG/1; MG/1
1 TABLET ORAL EVERY 6 HOURS PRN
COMMUNITY
Start: 2021-02-09

## 2021-02-09 RX ORDER — SODIUM CHLORIDE 9 MG/ML
1000 INJECTION, SOLUTION INTRAVENOUS ONCE
Status: COMPLETED | OUTPATIENT
Start: 2021-02-09 | End: 2021-02-09

## 2021-02-09 RX ORDER — POLYETHYLENE GLYCOL 3350 17 G/17G
1 POWDER, FOR SOLUTION ORAL
Status: DISCONTINUED | OUTPATIENT
Start: 2021-02-09 | End: 2021-02-10 | Stop reason: HOSPADM

## 2021-02-09 RX ORDER — ONDANSETRON 4 MG/1
4 TABLET, ORALLY DISINTEGRATING ORAL EVERY 6 HOURS PRN
COMMUNITY

## 2021-02-09 RX ORDER — ROSUVASTATIN CALCIUM 10 MG/1
10 TABLET, COATED ORAL EVERY EVENING
Status: DISCONTINUED | OUTPATIENT
Start: 2021-02-10 | End: 2021-02-10 | Stop reason: HOSPADM

## 2021-02-09 RX ORDER — AMOXICILLIN 250 MG
2 CAPSULE ORAL 2 TIMES DAILY
Status: DISCONTINUED | OUTPATIENT
Start: 2021-02-09 | End: 2021-02-10 | Stop reason: HOSPADM

## 2021-02-09 RX ORDER — TAMSULOSIN HYDROCHLORIDE 0.4 MG/1
0.4 CAPSULE ORAL EVERY EVENING
Status: DISCONTINUED | OUTPATIENT
Start: 2021-02-10 | End: 2021-02-10 | Stop reason: HOSPADM

## 2021-02-09 RX ADMIN — EPHEDRINE SULFATE 10 MG: 50 INJECTION INTRAMUSCULAR; INTRAVENOUS; SUBCUTANEOUS at 17:03

## 2021-02-09 RX ADMIN — SODIUM CHLORIDE, POTASSIUM CHLORIDE, SODIUM LACTATE AND CALCIUM CHLORIDE: 600; 310; 30; 20 INJECTION, SOLUTION INTRAVENOUS at 16:35

## 2021-02-09 RX ADMIN — ONDANSETRON 4 MG: 2 INJECTION INTRAMUSCULAR; INTRAVENOUS at 16:55

## 2021-02-09 RX ADMIN — SODIUM CHLORIDE 1000 ML: 9 INJECTION, SOLUTION INTRAVENOUS at 10:35

## 2021-02-09 RX ADMIN — PROPOFOL 200 MG: 10 INJECTION, EMULSION INTRAVENOUS at 16:34

## 2021-02-09 RX ADMIN — HYDROCODONE BITARTRATE AND ACETAMINOPHEN 15 MG: 7.5; 325 SOLUTION ORAL at 17:46

## 2021-02-09 RX ADMIN — HALOPERIDOL LACTATE 1 MG: 5 INJECTION, SOLUTION INTRAMUSCULAR at 17:46

## 2021-02-09 RX ADMIN — ONDANSETRON 4 MG: 2 INJECTION INTRAMUSCULAR; INTRAVENOUS at 17:35

## 2021-02-09 RX ADMIN — CEFAZOLIN 1 G: 330 INJECTION, POWDER, FOR SOLUTION INTRAMUSCULAR; INTRAVENOUS at 16:34

## 2021-02-09 RX ADMIN — SODIUM CHLORIDE: 9 INJECTION, SOLUTION INTRAVENOUS at 13:15

## 2021-02-09 RX ADMIN — FENTANYL CITRATE 25 MCG: 50 INJECTION, SOLUTION INTRAMUSCULAR; INTRAVENOUS at 18:08

## 2021-02-09 RX ADMIN — METOCLOPRAMIDE 10 MG: 5 INJECTION, SOLUTION INTRAMUSCULAR; INTRAVENOUS at 16:55

## 2021-02-09 RX ADMIN — KETOROLAC TROMETHAMINE 30 MG: 30 INJECTION, SOLUTION INTRAMUSCULAR at 10:54

## 2021-02-09 RX ADMIN — PROPOFOL 50 MG: 10 INJECTION, EMULSION INTRAVENOUS at 16:47

## 2021-02-09 RX ADMIN — LIDOCAINE HYDROCHLORIDE 100 MG: 20 INJECTION, SOLUTION EPIDURAL; INFILTRATION; INTRACAUDAL at 16:34

## 2021-02-09 RX ADMIN — KETOROLAC TROMETHAMINE 30 MG: 30 INJECTION, SOLUTION INTRAMUSCULAR at 16:57

## 2021-02-09 ASSESSMENT — PAIN DESCRIPTION - PAIN TYPE
TYPE: SURGICAL PAIN
TYPE: ACUTE PAIN
TYPE: ACUTE PAIN
TYPE: SURGICAL PAIN
TYPE: ACUTE PAIN

## 2021-02-09 ASSESSMENT — PATIENT HEALTH QUESTIONNAIRE - PHQ9
2. FEELING DOWN, DEPRESSED, IRRITABLE, OR HOPELESS: NOT AT ALL
SUM OF ALL RESPONSES TO PHQ9 QUESTIONS 1 AND 2: 0
1. LITTLE INTEREST OR PLEASURE IN DOING THINGS: NOT AT ALL

## 2021-02-09 ASSESSMENT — LIFESTYLE VARIABLES
CONSUMPTION TOTAL: NEGATIVE
HAVE PEOPLE ANNOYED YOU BY CRITICIZING YOUR DRINKING: NO
TOTAL SCORE: 0
DOES PATIENT WANT TO STOP DRINKING: NO
AVERAGE NUMBER OF DAYS PER WEEK YOU HAVE A DRINK CONTAINING ALCOHOL: 0
HAVE YOU EVER FELT YOU SHOULD CUT DOWN ON YOUR DRINKING: NO
ALCOHOL_USE: NO
TOTAL SCORE: 0
HOW MANY TIMES IN THE PAST YEAR HAVE YOU HAD 5 OR MORE DRINKS IN A DAY: 0
EVER HAD A DRINK FIRST THING IN THE MORNING TO STEADY YOUR NERVES TO GET RID OF A HANGOVER: NO
TOTAL SCORE: 0
ON A TYPICAL DAY WHEN YOU DRINK ALCOHOL HOW MANY DRINKS DO YOU HAVE: 0
EVER FELT BAD OR GUILTY ABOUT YOUR DRINKING: NO

## 2021-02-09 NOTE — ED PROVIDER NOTES
ED Provider Note    Scribed for Tiburcio Del Real M.D. by Bianka Segura. 2/9/2021  10:20 AM    Primary care provider: Deo IZAGUIRRE M.D.  Means of arrival: Walk-In  History obtained from: Patient  History limited by: None    CHIEF COMPLAINT  Chief Complaint   Patient presents with   • Flank Pain       HPI  Georges Joe is a 60 y.o. male who presents to the Emergency Department for evaluation of right sided flank pain initially occurring last Thursday. He has a past history of kidney stones, which required surgical operation in 2017. When the most recent pain began, the patient went to [place] for a CT scan of the abdomen. There was evidence of a 4 ml stone, that they believe will pass. He states that he has narrow tubes, and the pain/size of the stone exactly mimics his last stone. He does not believe the stone will pass on it's own. His last Oxycodone was taken at 4 AM with no alleviation. He denies any Covid-19 symptoms at this time. He had a small amount of tea at 6 AM, but has not eaten anything today.    REVIEW OF SYSTEMS  Pertinent positives include right sided flank pain.   Pertinent negatives include no Covid-19 symptoms.    All other systems reviewed and negative. See HPI for further details.     PAST MEDICAL HISTORY   has a past medical history of Acid reflux, Bowel habit changes, Depression, Dysphagia, Environmental and seasonal allergies, High cholesterol (07-), Kidney stone, Pain (07-), and Urinary bladder disorder.    SURGICAL HISTORY   has a past surgical history that includes cholecystectomy; lithotripsy; hydrocelectomy child; vocal cord stripping; umbilical hernia repair; ureteroscopy; cardiac cath, left heart; cystoscopy stent removal (Right, 8/3/2017); ureteroscopy (Right, 8/3/2017); and lasertripsy (Right, 8/3/2017).    SOCIAL HISTORY  Social History     Tobacco Use   • Smoking status: Former Smoker     Packs/day: 0.25     Years: 5.00     Pack years: 1.25      "Types: Cigarettes     Quit date: 1997     Years since quittin.1   • Smokeless tobacco: Never Used   Substance Use Topics   • Alcohol use: Yes     Alcohol/week: 0.0 oz     Comment: 2 per week   • Drug use: No      Social History     Substance and Sexual Activity   Drug Use No       FAMILY HISTORY  Family History   Problem Relation Age of Onset   • Heart Attack Father    • Heart Attack Brother        CURRENT MEDICATIONS  Home Medications     Reviewed by Luis Maza (Pharmacy Tech) on 21 at 1218  Med List Status: Complete   Medication Last Dose Status   finasteride (PROPECIA) 1 MG tablet 2021 Active   HYDROcodone/acetaminophen (NORCO)  MG Tab 2021 Active   lamotrigine (LAMICTAL) 200 MG tablet 2021 Active   minocycline (MINOCIN) 100 MG Cap 2021 Active   omeprazole (PRILOSEC) 20 MG delayed-release capsule 2021 Active   ondansetron (ZOFRAN ODT) 4 MG TABLET DISPERSIBLE 2021 Active   rosuvastatin (CRESTOR) 10 MG Tab 2021 Active   tamsulosin (FLOMAX) 0.4 MG capsule 2021 Active                ALLERGIES  Allergies   Allergen Reactions   • Sulfa Drugs Unspecified     Rxn - about 2009/10       PHYSICAL EXAM  VITAL SIGNS: /81   Pulse 94   Temp 36.5 °C (97.7 °F) (Temporal)   Resp 20   Ht 1.753 m (5' 9\")   Wt 76.8 kg (169 lb 5 oz)   SpO2 99%   BMI 25.00 kg/m²     Nursing note and vitals reviewed.  Constitutional: Well-developed and well-nourished. Mild distress secondary to pain.  HENT: Head is normocephalic and atraumatic. Oropharynx is clear and moist without exudate or erythema.   Eyes: Pupils are equal, round, and reactive to light. Conjunctiva are normal.   Cardiovascular: Normal rate and regular rhythm. No murmur heard. Normal radial pulses.  Pulmonary/Chest: Breath sounds normal. No wheezes or rales.   Abdominal: Soft and non-tender. No distention    Musculoskeletal: Extremities exhibit normal range of motion without edema or tenderness. "   Neurological: Awake, alert and oriented to person, place, and time. No focal deficits noted.  Skin: Skin is warm and dry. No rash.   Psychiatric: Normal mood and affect. Appropriate for clinical situation.  Back: Right CVA tenderness.    DIAGNOSTIC STUDIES / PROCEDURES    LABS  Results for orders placed or performed during the hospital encounter of 02/09/21   CBC WITH DIFFERENTIAL   Result Value Ref Range    WBC 11.4 (H) 4.8 - 10.8 K/uL    RBC 4.40 (L) 4.70 - 6.10 M/uL    Hemoglobin 14.0 14.0 - 18.0 g/dL    Hematocrit 41.0 (L) 42.0 - 52.0 %    MCV 93.2 81.4 - 97.8 fL    MCH 31.8 27.0 - 33.0 pg    MCHC 34.1 33.7 - 35.3 g/dL    RDW 46.1 35.9 - 50.0 fL    Platelet Count 209 164 - 446 K/uL    MPV 10.3 9.0 - 12.9 fL    Neutrophils-Polys 86.30 (H) 44.00 - 72.00 %    Lymphocytes 6.60 (L) 22.00 - 41.00 %    Monocytes 6.00 0.00 - 13.40 %    Eosinophils 0.30 0.00 - 6.90 %    Basophils 0.30 0.00 - 1.80 %    Immature Granulocytes 0.50 0.00 - 0.90 %    Nucleated RBC 0.00 /100 WBC    Neutrophils (Absolute) 9.86 (H) 1.82 - 7.42 K/uL    Lymphs (Absolute) 0.75 (L) 1.00 - 4.80 K/uL    Monos (Absolute) 0.69 0.00 - 0.85 K/uL    Eos (Absolute) 0.03 0.00 - 0.51 K/uL    Baso (Absolute) 0.03 0.00 - 0.12 K/uL    Immature Granulocytes (abs) 0.06 0.00 - 0.11 K/uL    NRBC (Absolute) 0.00 K/uL   BASIC METABOLIC PANEL   Result Value Ref Range    Sodium 136 135 - 145 mmol/L    Potassium 4.2 3.6 - 5.5 mmol/L    Chloride 100 96 - 112 mmol/L    Co2 24 20 - 33 mmol/L    Glucose 134 (H) 65 - 99 mg/dL    Bun 7 (L) 8 - 22 mg/dL    Creatinine 1.07 0.50 - 1.40 mg/dL    Calcium 9.3 8.5 - 10.5 mg/dL    Anion Gap 12.0 7.0 - 16.0   URINALYSIS CULTURE, IF INDICATED    Specimen: Urine   Result Value Ref Range    Color Yellow     Character Clear     Specific Gravity 1.021 <1.035    Ph 5.5 5.0 - 8.0    Glucose Negative Negative mg/dL    Ketones Trace (A) Negative mg/dL    Protein Negative Negative mg/dL    Bilirubin Negative Negative    Urobilinogen, Urine 0.2  Negative    Nitrite Negative Negative    Leukocyte Esterase Trace (A) Negative    Occult Blood Moderate (A) Negative    Micro Urine Req Microscopic    URINE MICROSCOPIC (W/UA)   Result Value Ref Range    WBC 0-2 (A) /hpf    RBC 20-50 (A) /hpf    Bacteria Negative None /hpf    Epithelial Cells Few /hpf    Mucous Threads Moderate /hpf   ESTIMATED GFR   Result Value Ref Range    GFR If African American >60 >60 mL/min/1.73 m 2    GFR If Non African American >60 >60 mL/min/1.73 m 2   SARS-COV Antigen KALEN: Collect dry nasal swab AND NP swab in VTM   Result Value Ref Range    SARS-CoV-2 Source Nasal Swab     SARS-COV ANTIGEN KALEN NotDetected Not-Detected   SARS-CoV-2 PCR (24 hour In-House): Collect NP swab in VTM    Specimen: Nasopharyngeal; Respirate   Result Value Ref Range    SARS-CoV-2 Source NP Swab    All labs reviewed by me.    RADIOLOGY  OUTSIDE IMAGES-CT ABDOMEN /PELVIS   Final Result      [id number] VIEW   Final Result      No definite urolithiasis is identified.      Nonspecific bowel gas pattern.         DX-CYSTO FLUORO < 1 HOUR    (Results Pending)     The radiologist's interpretation of all radiological studies have been reviewed by me.    COURSE & MEDICAL DECISION MAKING  Nursing notes, VS, PMSFHx reviewed in chart.     Review of past medical records shows the patient was seen by Dr. Mukherjee at [place] for a CT-abdomen on 2/07/2021. A 4 mL kidney stone was found on imaging. Patient now presents with flank pain.      10:20 AM - Patient seen and examined at bedside. I verified that the patient was wearing a mask and I was wearing appropriate PPE every time I entered the room. The patient's mask was on the patient at all times during my encounter except for a brief view of the oropharynx. Patient will be treated with NS infusion. Intravenous fluids were administered for dehydration.  Ordered DX_abdomen, CBC with differential, Basic metabolic panel, and UA culture to evaluate his symptoms. The differential  diagnoses include but are not limited to: known ureterolithiasis    12:08 PM I discussed the patient's case and the above findings with Dr. Marcos (Urologist) who agrees to consult the patient's case. He also recommend I hospitalize the patient and keep him NPO for possible procedure today.      1:13 PM I discussed the patient's case and the above findings with Dr. Toro (hospitalist) who agrees to hospitalize the patient and take over the plan of care moving forward.      DISPOSITION:  Patient will be hospitalized by Dr. Toro in guarded condition.    FINAL IMPRESSION  1. Ureterolithiasis    2. Right flank pain        IBianka (Scribe), am scribing for, and in the presence of, Tiburcio Del Real M.D..    Electronically signed by: Bianka Segura (Scribe), 2/9/2021    ITiburcio M.D. personally performed the services described in this documentation, as scribed by Bianka Segura in my presence, and it is both accurate and complete.    The note accurately reflects work and decisions made by me.  Tiburcio Del Real M.D.  2/9/2021  2:35 PM

## 2021-02-09 NOTE — CONSULTS
UROLOGY Consult Note:    BIANKA Rivas  Date & Time note created:    2/9/2021   1:23 PM       Patient ID:   Name:             Georges Joe   YOB: 1960  Age:                 60 y.o.  male   MRN:               [id number]                                                             Reason for Consult:      Right flank pain, right ureteral stone    History of Present Illness:    This is a 60 y.o. M who is known to [facility] as he has a history of nephrolithiasis.  He was recently seen as a outpatient 02/05/2020 for right flank pain.  At that time, a CT scan was obtained which showed a a 4 mm right proximal ureteral stone, right hydronephrosis, and punctate right kidney stones. Also, a urine culture obtained 02/05 did not grow any bacteria.  A UA during workup here showed blood, and leukocytes, but was nitrite negative.  His creatinine is 1.07.  He was attempting to pass stone with pain medication, tamsulosin, hydration; however, his flank pain worsened to the point where it was no longer controlled by the oral pain medication he was provided.  This led him to present to the ED.  Currently, he reports that pain is present, but minimal with the toradol given. He denies nausea, vomiting, fevers, chills.     Review of Systems:      Constitutional: Denies fevers, Denies weight changes  Eyes: Denies changes in vision, no eye pain  Ears/Nose/Throat/Mouth: Denies nasal congestion or sore throat   Cardiovascular: no chest pain, no palpitations   Respiratory: no shortness of breath , Denies cough  Gastrointestinal/Hepatic: See HPI  Genitourinary: See HPI  Musculoskeletal/Rheum: Denies  joint pain and swelling, no edema  Skin: Denies rash  Neurological: Denies headache, confusion, memory loss or focal weakness/parasthesias  Psychiatric: denies mood disorder   Endocrine: Kellie thyroid problems  Heme/Oncology/Lymph Nodes: Denies enlarged lymph nodes, denies brusing or known bleeding disorder  All  "other systems were reviewed and are negative (AMA/CMS criteria)                Past Medical History:   Past Medical History:   Diagnosis Date   • Acid reflux    • Bowel habit changes     constipation   • Depression    • Dysphagia     reports \"gagging easily\", sometimes difficulty swallowing certian foods   • Environmental and seasonal allergies    • High cholesterol 07-    reports not taking medication   • Kidney stone     Diagnosed in April 2013   • Pain 07-    with urination, 0/10   • Urinary bladder disorder     reports some pain w/urination and weak stream     There are no active hospital problems to display for this patient.      Past Surgical History:  Past Surgical History:   Procedure Laterality Date   • CYSTOSCOPY STENT REMOVAL Right 8/3/2017    Procedure: RIGID CYSTOSCOPY STENT REMOVAL;  Surgeon: Shahab Haile M.D.;  Location: SURGERY [facility];  Service:    • URETEROSCOPY Right 8/3/2017    Procedure: URETEROSCOPY FLEXIBLE;  Surgeon: Shahab Haile M.D.;  Location: SURGERY [facility];  Service:    • LASERTRIPSY Right 8/3/2017    Procedure: LASER LITHOTRIPSY;  Surgeon: Shahab Haile M.D.;  Location: SURGERY [facility];  Service:    • CARDIAC CATH, LEFT HEART     • CHOLECYSTECTOMY     • HYDROCELECTOMY CHILD     • LITHOTRIPSY     • UMBILICAL HERNIA REPAIR     • URETEROSCOPY      reports stent placement   • VOCAL CORD STRIPPING         Hospital Medications:    Current Facility-Administered Medications:   •  senna-docusate (PERICOLACE or SENOKOT S) 8.6-50 MG per tablet 2 Tab, 2 Tab, Oral, BID **AND** polyethylene glycol/lytes (MIRALAX) PACKET 1 Packet, 1 Packet, Oral, QDAY PRN **AND** magnesium hydroxide (MILK OF MAGNESIA) suspension 30 mL, 30 mL, Oral, QDAY PRN **AND** bisacodyl (DULCOLAX) suppository 10 mg, 10 mg, Rectal, QDAY PRN, Leatha Toro M.D.  •  NS infusion, , Intravenous, Continuous, Leatha Toro M.D.  •  acetaminophen (Tylenol) tablet 650 mg, 650 mg, Oral, " Q6HRS PRN, Leatha Toro M.D.    Current Outpatient Medications:   •  HYDROcodone/acetaminophen (NORCO)  MG Tab, Take 1 Tab by mouth every 6 hours as needed., Disp: , Rfl:   •  omeprazole (PRILOSEC) 20 MG delayed-release capsule, Take 20 mg by mouth every day., Disp: , Rfl:   •  ondansetron (ZOFRAN ODT) 4 MG TABLET DISPERSIBLE, Take 4 mg by mouth every 6 hours as needed for Nausea., Disp: , Rfl:   •  finasteride (PROPECIA) 1 MG tablet, Take 1 mg by mouth every day., Disp: , Rfl:   •  rosuvastatin (CRESTOR) 10 MG Tab, Take 10 mg by mouth every evening., Disp: , Rfl:   •  tamsulosin (FLOMAX) 0.4 MG capsule, Take 0.4 mg by mouth every evening., Disp: , Rfl:   •  minocycline (MINOCIN) 100 MG Cap, Take 100 mg by mouth every day. NO KNOWN STOP DATE, Disp: , Rfl:   •  lamotrigine (LAMICTAL) 200 MG tablet, Take 200 mg by mouth every morning. Indications: Manic-Depression, Depression, Disp: , Rfl:     Current Outpatient Medications:  (Not in a hospital admission)      Medication Allergy:  Allergies   Allergen Reactions   • Sulfa Drugs Unspecified     Rxn - about 2009/10       Family History:  Family History   Problem Relation Age of Onset   • Heart Attack Father    • Heart Attack Brother        Social History:  Social History     Socioeconomic History   • Marital status: Single     Spouse name: Not on file   • Number of children: Not on file   • Years of education: Not on file   • Highest education level: Not on file   Occupational History   • Not on file   Social Needs   • Financial resource strain: Not on file   • Food insecurity     Worry: Not on file     Inability: Not on file   • Transportation needs     Medical: Not on file     Non-medical: Not on file   Tobacco Use   • Smoking status: Former Smoker     Packs/day: 0.25     Years: 5.00     Pack years: 1.25     Types: Cigarettes     Quit date: 1997     Years since quittin.1   • Smokeless tobacco: Never Used   Substance and Sexual Activity   • Alcohol  "use: Yes     Alcohol/week: 0.0 oz     Comment: 2 per week   • Drug use: No   • Sexual activity: Not on file   Lifestyle   • Physical activity     Days per week: Not on file     Minutes per session: Not on file   • Stress: Not on file   Relationships   • Social connections     Talks on phone: Not on file     Gets together: Not on file     Attends Gnosticism service: Not on file     Active member of club or organization: Not on file     Attends meetings of clubs or organizations: Not on file     Relationship status: Not on file   • Intimate partner violence     Fear of current or ex partner: Not on file     Emotionally abused: Not on file     Physically abused: Not on file     Forced sexual activity: Not on file   Other Topics Concern   • Not on file   Social History Narrative   • Not on file         Physical Exam:  Vitals/ General Appearance:   Weight/BMI: Body mass index is 25 kg/m².  /81   Pulse 94   Temp 36.5 °C (97.7 °F) (Temporal)   Resp 20   Ht 1.753 m (5' 9\")   Wt 76.8 kg (169 lb 5 oz)   SpO2 99%   Vitals:    02/09/21 0932 02/09/21 0935 02/09/21 1123 02/09/21 1300   BP: 128/88  116/77 140/81   Pulse: 89  95 94   Resp: 17   20   Temp: 36.5 °C (97.7 °F)      TempSrc: Temporal      SpO2: 97%  95% 99%   Weight:  76.8 kg (169 lb 5 oz)     Height: 1.753 m (5' 9\")        Oxygen Therapy:  Pulse Oximetry: 99 %    Constitutional:   No acute distress  HENMT:  Normocephalic, Atraumatic,  Nose normal.  Eyes:  EOMI,  No discharge.  Neck:  Normal range of motion,  no JVD.  Lungs:  Normal breath respiratory effort.   Abdomen: Soft, No tenderness, No guarding, No rebound.  : No CVAT  Skin: Warm, Dry, No erythema.  Neurologic: Alert & oriented x 3.  Psychiatric: Affect normal, Judgment normal, Mood normal.      MDM (Data Review):     Records reviewed and summarized in current documentation    Lab Data Review:  Recent Results (from the past 24 hour(s))   URINALYSIS CULTURE, IF INDICATED    Collection Time: 02/09/21 " 10:03 AM    Specimen: Urine   Result Value Ref Range    Color Yellow     Character Clear     Specific Gravity 1.021 <1.035    Ph 5.5 5.0 - 8.0    Glucose Negative Negative mg/dL    Ketones Trace (A) Negative mg/dL    Protein Negative Negative mg/dL    Bilirubin Negative Negative    Urobilinogen, Urine 0.2 Negative    Nitrite Negative Negative    Leukocyte Esterase Trace (A) Negative    Occult Blood Moderate (A) Negative    Micro Urine Req Microscopic    URINE MICROSCOPIC (W/UA)    Collection Time: 02/09/21 10:03 AM   Result Value Ref Range    WBC 0-2 (A) /hpf    RBC 20-50 (A) /hpf    Bacteria Negative None /hpf    Epithelial Cells Few /hpf    Mucous Threads Moderate /hpf   CBC WITH DIFFERENTIAL    Collection Time: 02/09/21 10:35 AM   Result Value Ref Range    WBC 11.4 (H) 4.8 - 10.8 K/uL    RBC 4.40 (L) 4.70 - 6.10 M/uL    Hemoglobin 14.0 14.0 - 18.0 g/dL    Hematocrit 41.0 (L) 42.0 - 52.0 %    MCV 93.2 81.4 - 97.8 fL    MCH 31.8 27.0 - 33.0 pg    MCHC 34.1 33.7 - 35.3 g/dL    RDW 46.1 35.9 - 50.0 fL    Platelet Count 209 164 - 446 K/uL    MPV 10.3 9.0 - 12.9 fL    Neutrophils-Polys 86.30 (H) 44.00 - 72.00 %    Lymphocytes 6.60 (L) 22.00 - 41.00 %    Monocytes 6.00 0.00 - 13.40 %    Eosinophils 0.30 0.00 - 6.90 %    Basophils 0.30 0.00 - 1.80 %    Immature Granulocytes 0.50 0.00 - 0.90 %    Nucleated RBC 0.00 /100 WBC    Neutrophils (Absolute) 9.86 (H) 1.82 - 7.42 K/uL    Lymphs (Absolute) 0.75 (L) 1.00 - 4.80 K/uL    Monos (Absolute) 0.69 0.00 - 0.85 K/uL    Eos (Absolute) 0.03 0.00 - 0.51 K/uL    Baso (Absolute) 0.03 0.00 - 0.12 K/uL    Immature Granulocytes (abs) 0.06 0.00 - 0.11 K/uL    NRBC (Absolute) 0.00 K/uL   BASIC METABOLIC PANEL    Collection Time: 02/09/21 10:35 AM   Result Value Ref Range    Sodium 136 135 - 145 mmol/L    Potassium 4.2 3.6 - 5.5 mmol/L    Chloride 100 96 - 112 mmol/L    Co2 24 20 - 33 mmol/L    Glucose 134 (H) 65 - 99 mg/dL    Bun 7 (L) 8 - 22 mg/dL    Creatinine 1.07 0.50 - 1.40 mg/dL     Calcium 9.3 8.5 - 10.5 mg/dL    Anion Gap 12.0 7.0 - 16.0   ESTIMATED GFR    Collection Time: 02/09/21 10:35 AM   Result Value Ref Range    GFR If African American >60 >60 mL/min/1.73 m 2    GFR If Non African American >60 >60 mL/min/1.73 m 2   SARS-CoV-2 PCR (24 hour In-House): Collect NP swab in VTM    Collection Time: 02/09/21 12:30 PM    Specimen: Nasopharyngeal; Respirate   Result Value Ref Range    SARS-CoV-2 Source NP Swab    SARS-COV Antigen KALEN: Collect dry nasal swab AND NP swab in VTM    Collection Time: 02/09/21 12:31 PM   Result Value Ref Range    SARS-CoV-2 Source Nasal Swab     SARS-COV ANTIGEN KALEN NotDetected Not-Detected       Imaging/Procedures Review:    Reviewed    MDM (Assessment and Plan):     60 y.o. M with a history of nephrolithiasis; now with right flank pain.  At that time, a 4 mm right proximal ureteral stone, right hydronephrosis.  Plan:  - monitor pain control  - monitor renal function   - keep NPO  - anticipate a cystoscopy right ureteroscopy with laser lithotripsy and possible right ureteral stent with Dr. Marcos.  - urology will follow    Dr. Marcos is aware of this consult and the direction of the plan of care.

## 2021-02-09 NOTE — DISCHARGE PLANNING
Met with patient and Elizabet GARDNER at bedside. Demographic info verified Patient uses [facility] on [address] for prescriptions.           Care Transition Team Assessment    Information Source  Orientation : Oriented x 4  Information Given By: Patient  Informant's Name: Georges  Who is responsible for making decisions for patient? : Patient    Readmission Evaluation  Is this a readmission?: No    Elopement Risk  Legal Hold: No    Interdisciplinary Discharge Planning  Primary Care Physician: Deo Mendez  Lives with - Patient's Self Care Capacity: Significant Other  Patient or legal guardian wants to designate a caregiver: No  Support Systems: Spouse / Significant Other  Do You Take your Prescribed Medications Regularly: Yes  Mobility Issues: No  Durable Medical Equipment: Not Applicable    Discharge Preparedness  What is your plan after discharge?: Home with help  What are your discharge supports?: Other (comment)(SO)  Prior Functional Level: Ambulatory, Drives Self, Independent with Activities of Daily Living    Functional Assesment  Prior Functional Level: Ambulatory, Drives Self, Independent with Activities of Daily Living    Finances  Financial Barriers to Discharge: No  Prescription Coverage: Yes    Domestic Abuse  Have you ever been the victim of abuse or violence?: No    Psychological Assessment  History of Substance Abuse: None  History of Psychiatric Problems: No  Non-compliant with Treatment: No  Newly Diagnosed Illness: No    Discharge Risks or Barriers  Discharge risks or barriers?: No    Anticipated Discharge Information  Discharge Disposition: Still a Patient (30)  Discharge Address: [address]  Discharge Contact Phone Number: [phone]

## 2021-02-10 VITALS
BODY MASS INDEX: 24.91 KG/M2 | DIASTOLIC BLOOD PRESSURE: 61 MMHG | OXYGEN SATURATION: 95 % | HEIGHT: 69 IN | TEMPERATURE: 98.4 F | RESPIRATION RATE: 18 BRPM | WEIGHT: 168.21 LBS | SYSTOLIC BLOOD PRESSURE: 104 MMHG | HEART RATE: 82 BPM

## 2021-02-10 PROBLEM — N20.0 NEPHROLITHIASIS: Status: RESOLVED | Noted: 2021-02-09 | Resolved: 2021-02-10

## 2021-02-10 PROCEDURE — 700111 HCHG RX REV CODE 636 W/ 250 OVERRIDE (IP): Performed by: NURSE PRACTITIONER

## 2021-02-10 PROCEDURE — A9270 NON-COVERED ITEM OR SERVICE: HCPCS | Performed by: STUDENT IN AN ORGANIZED HEALTH CARE EDUCATION/TRAINING PROGRAM

## 2021-02-10 PROCEDURE — 99217 PR OBSERVATION CARE DISCHARGE: CPT | Performed by: INTERNAL MEDICINE

## 2021-02-10 PROCEDURE — G0378 HOSPITAL OBSERVATION PER HR: HCPCS

## 2021-02-10 PROCEDURE — 700102 HCHG RX REV CODE 250 W/ 637 OVERRIDE(OP): Performed by: STUDENT IN AN ORGANIZED HEALTH CARE EDUCATION/TRAINING PROGRAM

## 2021-02-10 PROCEDURE — 96376 TX/PRO/DX INJ SAME DRUG ADON: CPT

## 2021-02-10 RX ORDER — KETOROLAC TROMETHAMINE 30 MG/ML
30 INJECTION, SOLUTION INTRAMUSCULAR; INTRAVENOUS EVERY 6 HOURS PRN
Status: DISCONTINUED | OUTPATIENT
Start: 2021-02-10 | End: 2021-02-10 | Stop reason: HOSPADM

## 2021-02-10 RX ORDER — OXYCODONE HYDROCHLORIDE 10 MG/1
10 TABLET ORAL EVERY 4 HOURS PRN
Status: DISCONTINUED | OUTPATIENT
Start: 2021-02-10 | End: 2021-02-10 | Stop reason: HOSPADM

## 2021-02-10 RX ORDER — OXYCODONE HYDROCHLORIDE 5 MG/1
5 TABLET ORAL EVERY 4 HOURS PRN
Status: DISCONTINUED | OUTPATIENT
Start: 2021-02-10 | End: 2021-02-10 | Stop reason: HOSPADM

## 2021-02-10 RX ADMIN — OMEPRAZOLE 20 MG: 20 CAPSULE, DELAYED RELEASE ORAL at 05:48

## 2021-02-10 RX ADMIN — LAMOTRIGINE 200 MG: 100 TABLET ORAL at 05:48

## 2021-02-10 RX ADMIN — DOCUSATE SODIUM 50 MG AND SENNOSIDES 8.6 MG 2 TABLET: 8.6; 5 TABLET, FILM COATED ORAL at 05:49

## 2021-02-10 RX ADMIN — KETOROLAC TROMETHAMINE 30 MG: 30 INJECTION, SOLUTION INTRAMUSCULAR at 10:50

## 2021-02-10 ASSESSMENT — PAIN DESCRIPTION - PAIN TYPE
TYPE: ACUTE PAIN
TYPE: ACUTE PAIN
TYPE: SURGICAL PAIN
TYPE: SURGICAL PAIN

## 2021-02-10 NOTE — PROGRESS NOTES
Discharge information reviewed with patient and responsible adult. No questions or concerns at this time. IV DC'd. Pt discharged to discharge lounge.

## 2021-02-10 NOTE — ANESTHESIA PROCEDURE NOTES
Airway    Date/Time: 2/9/2021 4:35 PM  Performed by: Marck Smalls M.D.  Authorized by: Marck Smalls M.D.     Location:  OR  Urgency:  Elective  Indications for Airway Management:  Anesthesia      Spontaneous Ventilation: absent    Sedation Level:  Deep  Preoxygenated: Yes    Final Airway Type:  Supraglottic airway  Final Supraglottic Airway:  Standard LMA    SGA Size:  4  Number of Attempts at Approach:  1

## 2021-02-10 NOTE — OP REPORT
DATE OF SERVICE:  02/09/2021     NAME OF OPERATIONS:  1.  Right ureteroscopy, laser lithotripsy.   2.  Right retrograde pyelogram.     PREOPERATIVE DIAGNOSIS:  Right proximal ureteral stone, 4 mm.     POSTOPERATIVE DIAGNOSIS:  Right proximal ureteral stone, 4 mm.     PRIMARY SURGEON:  Kosta Marcos MD     ANESTHESIOLOGIST:  Marck Smalls MD     FINDINGS:  Approximate 4 mm stone in the proximal ureter migrated to a   mid-pole position of the kidney where it was completely dusted.  Fairly narrow   ureter, but normal drainage at the case conclusion.  No stent left.     INDICATIONS:  Briefly, the patient is a 60-year-old gentleman with a history   of intractable renal colic secondary to an obstructing ureteral stone.  Upon   consideration of options, he elected to undergo surgical management.  Informed   consent has been obtained.     DESCRIPTION OF PROCEDURE:  The patient was taken to the operating room and   placed on the operating table in supine position.  After administration of   general anesthetic, he was placed in lithotomy.  Genitals were prepped and   draped sterilely.  A 21-Bangladeshi cystoscope was passed in the bladder with the   visualizing obturator.  Urethra was within normal limits.  Bladder was within   normal limits.  A single right ureteral orifice was identified and a 0.035   guidewire was passed under fluoroscopic guidance to the right kidney.     A Nicolás One Step dilator was used to gently dilate the ureter to the   mid-ureter.  This was somewhat snug.  This was removed and a digital flexible   disposable ureteroscope was then passed over the wire to the level of the   ureteropelvic junction.  The wire was removed.  There was no stone seen at   this location.  Nephroscopy was performed.  The 4 mm stone was then identified   in a mid-pole position.  Here, 200 micron holmium laser fiber was employed to   completely dust the stone.  The stone was dusted excellently.     Remainder of  nephroscopy was unremarkable.  The entire length of the ureter   was inspected as the scope was removed.  There were some areas of ureteral   narrowing and slight ureteral ecchymosis, but no shannon mucosal injury, nor   ureteral stones.     A 6-Serbian open-ended ureteral catheter was then placed in the right ureteral   orifice and approximately 6 mL of half-strength contrast was injected   retrograde.  There was normal course and caliber of the ureter with only mild   hydronephrosis demonstrated.  With removal of the catheter, there was prompt   and complete drainage of contrast material from the ureter.  Decision was made   not to leave a stent.  The patient's bladder was drained.  The case   concluded.  The patient tolerated the procedure well and was taken to recovery   room in stable condition.  It is our anticipation that he will be going home   following this.        ______________________________  Kosta Marcos MD MCM/DUNCAN/MADALYN    DD:  02/09/2021 17:17  DT:  02/09/2021 18:15    Job#:  [id number]

## 2021-02-10 NOTE — PROGRESS NOTES
Pt arrived back from PACU. Pt AAOx4, wife at bedside. Pt ambulated to restroom. Pain controlled. Report to LEVY Bermeo

## 2021-02-10 NOTE — ANESTHESIA POSTPROCEDURE EVALUATION
Patient: Georges Joe    Procedure Summary     Date: 02/09/21 Room / Location: [place] [address] / SURGERY [facility]    Anesthesia Start: 1628 Anesthesia Stop: 1710    Procedures:       CYSTOSCOPY, WITH URETERAL STENT INSERTION (Right Penis)      LITHOTRIPSY, USING LASER (Right Penis)      URETEROSCOPY (Right Penis) Diagnosis: (right ureteral stone)    Surgeons: Kosta Marcos M.D. Responsible Provider: Marck Smalls M.D.    Anesthesia Type: general ASA Status: 2          Final Anesthesia Type: general  Last vitals  BP   Blood Pressure: 108/55    Temp   36.4 °C (97.5 °F)    Pulse   Pulse: 99   Resp   17    SpO2   98 %      Anesthesia Post Evaluation    Patient location during evaluation: PACU  Patient participation: complete - patient participated  Level of consciousness: awake and alert    Airway patency: patent  Anesthetic complications: no  Cardiovascular status: hemodynamically stable  Respiratory status: acceptable  Hydration status: euvolemic    PONV: none           Nurse Pain Score: 0 (NPRS)

## 2021-02-10 NOTE — OR SURGEON
Immediate Post OP Note    PreOp Diagnosis: right proximal ureteral stone    PostOp Diagnosis: same    Procedure(s):  CYSTOSCOPY, WITH URETERAL STENT INSERTION - Wound Class: Clean Contaminated  LITHOTRIPSY, USING LASER - Wound Class: Clean Contaminated  URETEROSCOPY - Wound Class: Clean Contaminated  RPG, RIGHT    Surgeon(s):  Kosta Marcos M.D.    Anesthesiologist/Type of Anesthesia:  Anesthesiologist: Marck Smalls M.D./General    Surgical Staff:  Circulator: Antoinette Rodriguez R.N.  Laser Staff: Julio C Figueroa  Scrub Person: Christian Altamirano    Specimens removed if any:  * No specimens in log *    Estimated Blood Loss: min    Findings: 4mm stone, relocated to kidney, completely dusted, no stent    Complications: none        2/9/2021 5:12 PM Kosta Marcos M.D.

## 2021-02-10 NOTE — H&P
Hospital Medicine History & Physical Note    Date of Service  2/9/2021    Primary Care Physician  Deo IZAGUIRRE M.D.    Consultants  Urology - Dr. Marcos.     Code Status  Full Code    Chief Complaint  Chief Complaint   Patient presents with   • Flank Pain       History of Presenting Illness  60 y.o. male who presented 2/9/2021 right-sided flank pain.  Patient reports he was seen in an outpatient at [facility] on 2/5/2020 for right flank pain.  CT scan at the time showed a 4 mm right proximal ureteral stone, right hydronephrosis.  Patient reports urinalysis at the time showed no infection.  Reported to the ED today because his pain has continued to worsen in the outpatient setting.  He denies fever/chills, chest pain, nausea/vomiting.  He does flank pain that radiates to his right anterior abdomen.    Urology, Dr. Marcos, was consulted by the EDP.       Review of Systems  Review of Systems   All other systems reviewed and are negative.      Past Medical History   has a past medical history of Acid reflux, Bowel habit changes, Depression, Dysphagia, Environmental and seasonal allergies, High cholesterol (07-), Kidney stone, Pain (07-), and Urinary bladder disorder.    Surgical History   has a past surgical history that includes cholecystectomy; lithotripsy; hydrocelectomy child; vocal cord stripping; umbilical hernia repair; ureteroscopy; cardiac cath, left heart; cystoscopy stent removal (Right, 8/3/2017); ureteroscopy (Right, 8/3/2017); lasertripsy (Right, 8/3/2017); other; pr cystoscopy,insert ureteral stent (Right, 2/9/2021); pr cysto/uretero/pyeloscopy, dx (Right, 2/9/2021); and lasertripsy (Right, 2/9/2021).     Family History  family history includes Heart Attack in his brother and father.     Social History   reports that he quit smoking about 24 years ago. His smoking use included cigarettes. He has a 1.25 pack-year smoking history. He has never used smokeless tobacco. He  reports current alcohol use. He reports that he does not use drugs.    Allergies  Allergies   Allergen Reactions   • Sulfa Drugs Unspecified     Rxn - about 2009/10       Medications  Prior to Admission Medications   Prescriptions Last Dose Informant Patient Reported? Taking?   HYDROcodone/acetaminophen (NORCO)  MG Tab 2/9/2021 at AM Patient Yes Yes   Sig: Take 1 Tab by mouth every 6 hours as needed.   finasteride (PROPECIA) 1 MG tablet 2/8/2021 at PM Patient Yes Yes   Sig: Take 1 mg by mouth every day.   lamotrigine (LAMICTAL) 200 MG tablet 2/9/2021 at AM Patient Yes No   Sig: Take 200 mg by mouth every morning. Indications: Manic-Depression, Depression   minocycline (MINOCIN) 100 MG Cap 2/8/2021 at AM Patient Yes No   Sig: Take 100 mg by mouth every day. NO KNOWN STOP DATE   omeprazole (PRILOSEC) 20 MG delayed-release capsule 2/8/2021 at AM Patient Yes Yes   Sig: Take 20 mg by mouth every day.   ondansetron (ZOFRAN ODT) 4 MG TABLET DISPERSIBLE 2/9/2021 at 0900 Patient Yes Yes   Sig: Take 4 mg by mouth every 6 hours as needed for Nausea.   rosuvastatin (CRESTOR) 10 MG Tab 2/8/2021 at PM Patient Yes Yes   Sig: Take 10 mg by mouth every evening.   tamsulosin (FLOMAX) 0.4 MG capsule 2/8/2021 at PM Patient Yes No   Sig: Take 0.4 mg by mouth every evening.      Facility-Administered Medications: None       Physical Exam  Temp:  [36.1 °C (97 °F)-36.7 °C (98.1 °F)] 36.4 °C (97.5 °F)  Pulse:  [] 93  Resp:  [12-22] 18  BP: ()/(55-88) 107/62  SpO2:  [86 %-100 %] 96 %    Physical Exam  Vitals signs and nursing note reviewed.   Constitutional:       General: He is not in acute distress.     Appearance: He is not ill-appearing, toxic-appearing or diaphoretic.   HENT:      Head: Normocephalic and atraumatic.      Nose: No congestion.      Mouth/Throat:      Mouth: Mucous membranes are moist.      Pharynx: Oropharynx is clear.   Eyes:      General: No scleral icterus.     Conjunctiva/sclera: Conjunctivae normal.    Cardiovascular:      Rate and Rhythm: Normal rate and regular rhythm.      Pulses: Normal pulses.      Heart sounds: Normal heart sounds. No murmur.   Pulmonary:      Effort: Pulmonary effort is normal. No respiratory distress.      Breath sounds: Normal breath sounds. No stridor. No wheezing.   Abdominal:      General: Bowel sounds are normal. There is no distension.      Palpations: Abdomen is soft.      Tenderness: There is no abdominal tenderness. There is right CVA tenderness. There is no left CVA tenderness, guarding or rebound.   Musculoskeletal:         General: No swelling or tenderness.   Skin:     Capillary Refill: Capillary refill takes less than 2 seconds.      Coloration: Skin is not jaundiced.   Neurological:      Mental Status: He is alert and oriented to person, place, and time. Mental status is at baseline.   Psychiatric:         Mood and Affect: Mood normal.         Behavior: Behavior normal.         Laboratory:  Recent Labs     02/09/21  1035   WBC 11.4*   RBC 4.40*   HEMOGLOBIN 14.0   HEMATOCRIT 41.0*   MCV 93.2   MCH 31.8   MCHC 34.1   RDW 46.1   PLATELETCT 209   MPV 10.3     Recent Labs     02/09/21  1035   SODIUM 136   POTASSIUM 4.2   CHLORIDE 100   CO2 24   GLUCOSE 134*   BUN 7*   CREATININE 1.07   CALCIUM 9.3     Recent Labs     02/09/21  1035   GLUCOSE 134*         No results for input(s): NTPROBNP in the last 72 hours.      No results for input(s): TROPONINT in the last 72 hours.    Imaging:  DX-CYSTO FLUORO < 1 HOUR   Final Result      Cysto fluoroscopy utilized for 14 seconds         INTERPRETING LOCATION: [address] [place] [address], [facility]      OUTSIDE IMAGES-CT ABDOMEN /PELVIS   Final Result      [id number] VIEW   Final Result      No definite urolithiasis is identified.      Nonspecific bowel gas pattern.               Assessment/Plan:  I anticipate this patient is appropriate for observation status at this time.    * Nephrolithiasis- (present on admission)  Assessment & Plan  Urology  consulted. S/p ureteroscopy with lithotripsy and plan for right ureteral stent placement but no stent was placed.   Pain control. Monitor overnight and likely will be able to discharge in the morning.   Continue tamsulosin and finasteride.  Monitor UOP.     Dyslipidemia, goal LDL below 100- (present on admission)  Assessment & Plan  Resume home rosuvastatin.     HTN (hypertension)- (present on admission)  Assessment & Plan  Borderline low BP.  Monitor VS.

## 2021-02-10 NOTE — ANESTHESIA PREPROCEDURE EVALUATION
Relevant Problems   CARDIAC   (+) HTN (hypertension)       Physical Exam    Airway   Mallampati: II  TM distance: >3 FB  Neck ROM: full       Cardiovascular - normal exam  Rhythm: regular  Rate: normal  (-) murmur     Dental - normal exam           Pulmonary - normal exam  Breath sounds clear to auscultation     Abdominal    Neurological - normal exam                 Anesthesia Plan    ASA 2       Plan - general       Airway plan will be LMA        Induction: intravenous      Pertinent diagnostic labs and testing reviewed    Informed Consent:    Anesthetic plan and risks discussed with patient.

## 2021-02-10 NOTE — DISCHARGE INSTRUCTIONS
Kidney Stones    Kidney stones (urolithiasis) are rock-like masses that form inside of the kidneys. Kidneys are organs that make pee (urine). A kidney stone can cause very bad pain and can block the flow of pee. The stone usually leaves your body (passes) through your pee. You may need to have a doctor take out the stone.  Follow these instructions at home:  Eating and drinking  · Drink enough fluid to keep your pee clear or pale yellow. This will help you pass the stone.  · If told by your doctor, change the foods you eat (your diet). This may include:  ? Limiting how much salt (sodium) you eat.  ? Eating more fruits and vegetables.  ? Limiting how much meat, poultry, fish, and eggs you eat.  · Follow instructions from your doctor about eating or drinking restrictions.  General instructions  · Collect pee samples as told by your doctor. You may need to collect a pee sample:  ? 24 hours after a stone comes out.  ? 8-12 weeks after a stone comes out, and every 6-12 months after that.  · Strain your pee every time you pee (urinate), for as long as told. Use the strainer that your doctor recommends.  · Do not throw out the stone. Keep it so that it can be tested by your doctor.  · Take over-the-counter and prescription medicines only as told by your doctor.  · Keep all follow-up visits as told by your doctor. This is important. You may need follow-up tests.  Preventing kidney stones  To prevent another kidney stone:  · Drink enough fluid to keep your pee clear or pale yellow. This is the best way to prevent kidney stones.  · Eat healthy foods.  · Avoid certain foods as told by your doctor. You may be told to eat less protein.  · Stay at a healthy weight.  Contact a doctor if:  · You have pain that gets worse or does not get better with medicine.  Get help right away if:  · You have a fever or chills.  · You get very bad pain.  · You get new pain in your belly (abdomen).  · You pass out (faint).  · You cannot pee.  This  information is not intended to replace advice given to you by your health care provider. Make sure you discuss any questions you have with your health care provider.  Document Released: 06/05/2009 Document Revised: 07/30/2019 Document Reviewed: 09/05/2017  [employer] Patient Education © 2020 [employer]      Discharge Instructions    Discharged to home by car with relative. Discharged via wheelchair, hospital escort: Yes.  Special equipment needed: Not Applicable    Be sure to schedule a follow-up appointment with your primary care doctor or any specialists as instructed.     Discharge Plan:   Diet Plan: Discussed  Activity Level: Discussed  Confirmed Follow up Appointment: Patient to Call and Schedule Appointment  Confirmed Symptoms Management: Discussed  Medication Reconciliation Updated: Yes  Influenza Vaccine Indication: Not indicated: Previously immunized this influenza season and > 8 years of age(Recieved 09-)    I understand that a diet low in cholesterol, fat, and sodium is recommended for good health. Unless I have been given specific instructions below for another diet, I accept this instruction as my diet prescription.   Other diet: Regular diet as tolerated.    Special Instructions: None    · Is patient discharged on Warfarin / Coumadin?   No     Depression / Suicide Risk    As you are discharged from this [facility] facility, it is important to learn how to keep safe from harming yourself.    Recognize the warning signs:  · Abrupt changes in personality, positive or negative- including increase in energy   · Giving away possessions  · Change in eating patterns- significant weight changes-  positive or negative  · Change in sleeping patterns- unable to sleep or sleeping all the time   · Unwillingness or inability to communicate  · Depression  · Unusual sadness, discouragement and loneliness  · Talk of wanting to die  · Neglect of personal appearance   · Rebelliousness- reckless  behavior  · Withdrawal from people/activities they love  · Confusion- inability to concentrate     If you or a loved one observes any of these behaviors or has concerns about self-harm, here's what you can do:  · Talk about it- your feelings and reasons for harming yourself  · Remove any means that you might use to hurt yourself (examples: pills, rope, extension cords, firearm)  · Get professional help from the community (Mental Health, Substance Abuse, psychological counseling)  · Do not be alone:Call your Safe Contact- someone whom you trust who will be there for you.  · Call your local CRISIS HOTLINE 987-7895 or 773-118-6040  · Call your local Children's Mobile Crisis Response Team [place] (243) 053-1912 or www.[employer]Guard  · Call the toll free National Suicide Prevention Hotlines   · National Suicide Prevention Lifeline 158-038-AANU (5887)  · National Hope Line Network 800-SUICIDE (689-8486)

## 2021-02-10 NOTE — ASSESSMENT & PLAN NOTE
Urology consulted. S/p ureteroscopy with lithotripsy and plan for right ureteral stent placement but no stent was placed.   Pain control. Monitor overnight and likely will be able to discharge in the morning.   Continue tamsulosin and finasteride.  Monitor UOP.   NS at 125ml/hr overnight.

## 2021-02-10 NOTE — OR NURSING
"Patient recovering well post op, AAOx4, calm with c/o headache and right flank \"ureteral pain\". Oral and IV pain medication given, pain down to 2/10 and patient is resting comfortably. VSS, afebrile, 2L nasal cannula 100%. Nausea improved with antiemetics, tolerating water and gingerale. Wife updated on status and plan of care. Report to Kaity LOCKETT.   "

## 2021-02-10 NOTE — DISCHARGE SUMMARY
Discharge Summary    CHIEF COMPLAINT ON ADMISSION  Chief Complaint   Patient presents with   • Flank Pain       Reason for Admission  Flank Pain     Admission Date  2/9/2021    CODE STATUS  Full Code    HPI & HOSPITAL COURSE  This is a 60 y.o. male here with right sided-flank pain. He was seen at [facility] on 2/5/202. CT at that time showed 4 mm right proximal ureteral stone, right hydronephrosis. Urology was consulted and he was taken for cystoscopy with stent placement on 2/9/2021. He was monitored overnight for post-op complications and pain control. He was discharged the following morning once pain was controlled and tolerating PO intake.       Therefore, he is discharged in good and stable condition to home with close outpatient follow-up.      Discharge Date  2/10/2021    FOLLOW UP ITEMS POST DISCHARGE  Urology     DISCHARGE DIAGNOSES  Principal Problem (Resolved):    Nephrolithiasis POA: Yes  Active Problems:    HTN (hypertension) POA: Yes    Dyslipidemia, goal LDL below 100 POA: Yes      FOLLOW UP  No future appointments.        As needed, If symptoms worsen      MEDICATIONS ON DISCHARGE     Medication List      START taking these medications      Instructions   oxyCODONE-acetaminophen 5-325 MG Tabs  Commonly known as: PERCOCET   Take 1 Tab by mouth every 6 hours as needed for up to 3 days.  Dose: 1 tablet        CONTINUE taking these medications      Instructions   finasteride 1 MG tablet  Commonly known as: PROPECIA   Take 1 mg by mouth every day.  Dose: 1 mg     HYDROcodone/acetaminophen  MG Tabs  Commonly known as: NORCO   Take 1 Tab by mouth every 6 hours as needed.  Dose: 1 tablet     lamotrigine 200 MG tablet  Commonly known as: LAMICTAL   Take 200 mg by mouth every morning. Indications: Manic-Depression, Depression  Dose: 200 mg     minocycline 100 MG Caps  Commonly known as: MINOCIN   Take 100 mg by mouth every day. NO KNOWN STOP DATE  Dose: 100 mg     omeprazole 20 MG delayed-release  capsule  Commonly known as: PRILOSEC   Take 20 mg by mouth every day.  Dose: 20 mg     ondansetron 4 MG Tbdp  Commonly known as: ZOFRAN ODT   Take 4 mg by mouth every 6 hours as needed for Nausea.  Dose: 4 mg     rosuvastatin 10 MG Tabs  Commonly known as: CRESTOR   Take 10 mg by mouth every evening.  Dose: 10 mg     tamsulosin 0.4 MG capsule  Commonly known as: FLOMAX   Take 0.4 mg by mouth every evening.  Dose: 0.4 mg            Allergies  Allergies   Allergen Reactions   • Sulfa Drugs Unspecified     Rxn - about 2009/10       DIET  Orders Placed This Encounter   Procedures   • Diet Order Diet: Regular     Standing Status:   Standing     Number of Occurrences:   1     Order Specific Question:   Diet:     Answer:   Regular [1]       ACTIVITY  As tolerated.  Weight bearing as tolerated    CONSULTATIONS  Urology,      PROCEDURES  2/9/2021: right ureteral stent placement     LABORATORY  Lab Results   Component Value Date    SODIUM 136 02/09/2021    POTASSIUM 4.2 02/09/2021    CHLORIDE 100 02/09/2021    CO2 24 02/09/2021    GLUCOSE 134 (H) 02/09/2021    BUN 7 (L) 02/09/2021    CREATININE 1.07 02/09/2021    CREATININE 1.4 10/12/2006        Lab Results   Component Value Date    WBC 11.4 (H) 02/09/2021    HEMOGLOBIN 14.0 02/09/2021    HEMATOCRIT 41.0 (L) 02/09/2021    PLATELETCT 209 02/09/2021        Total time of the discharge process exceeds 35 minutes.

## 2021-02-10 NOTE — ANESTHESIA TIME REPORT
Anesthesia Start and Stop Event Times     Date Time Event    2/9/2021 1628 Anesthesia Start     1629 Ready for Procedure     1710 Anesthesia Stop        Responsible Staff  02/09/21    Name Role Begin End    Marck Smalls M.D. Anesth 1628 1710        Preop Diagnosis (Free Text):  Pre-op Diagnosis     right ureteral stone        Preop Diagnosis (Codes):    Post op Diagnosis  Right ureteral stone      Premium Reason  A. 3PM - 7AM    Comments:

## 2022-05-18 ENCOUNTER — APPOINTMENT (RX ONLY)
Dept: URBAN - METROPOLITAN AREA CLINIC 31 | Facility: CLINIC | Age: 62
Setting detail: DERMATOLOGY
End: 2022-05-18

## 2022-05-18 DIAGNOSIS — L73.9 FOLLICULAR DISORDER, UNSPECIFIED: ICD-10-CM

## 2022-05-18 DIAGNOSIS — Z71.89 OTHER SPECIFIED COUNSELING: ICD-10-CM

## 2022-05-18 DIAGNOSIS — L663 OTHER SPECIFIED DISEASES OF HAIR AND HAIR FOLLICLES: ICD-10-CM

## 2022-05-18 DIAGNOSIS — L71.8 OTHER ROSACEA: ICD-10-CM

## 2022-05-18 DIAGNOSIS — D22 MELANOCYTIC NEVI: ICD-10-CM

## 2022-05-18 DIAGNOSIS — D18.0 HEMANGIOMA: ICD-10-CM

## 2022-05-18 DIAGNOSIS — L82.1 OTHER SEBORRHEIC KERATOSIS: ICD-10-CM

## 2022-05-18 DIAGNOSIS — L738 OTHER SPECIFIED DISEASES OF HAIR AND HAIR FOLLICLES: ICD-10-CM

## 2022-05-18 DIAGNOSIS — B35.1 TINEA UNGUIUM: ICD-10-CM

## 2022-05-18 DIAGNOSIS — B35.3 TINEA PEDIS: ICD-10-CM

## 2022-05-18 DIAGNOSIS — L81.4 OTHER MELANIN HYPERPIGMENTATION: ICD-10-CM

## 2022-05-18 PROBLEM — L02.223 FURUNCLE OF CHEST WALL: Status: ACTIVE | Noted: 2022-05-18

## 2022-05-18 PROBLEM — D23.71 OTHER BENIGN NEOPLASM OF SKIN OF RIGHT LOWER LIMB, INCLUDING HIP: Status: ACTIVE | Noted: 2022-05-18

## 2022-05-18 PROBLEM — D18.01 HEMANGIOMA OF SKIN AND SUBCUTANEOUS TISSUE: Status: ACTIVE | Noted: 2022-05-18

## 2022-05-18 PROBLEM — D22.5 MELANOCYTIC NEVI OF TRUNK: Status: ACTIVE | Noted: 2022-05-18

## 2022-05-18 PROBLEM — D23.72 OTHER BENIGN NEOPLASM OF SKIN OF LEFT LOWER LIMB, INCLUDING HIP: Status: ACTIVE | Noted: 2022-05-18

## 2022-05-18 PROBLEM — L02.221 FURUNCLE OF ABDOMINAL WALL: Status: ACTIVE | Noted: 2022-05-18

## 2022-05-18 PROCEDURE — ? COUNSELING

## 2022-05-18 PROCEDURE — ? PRESCRIPTION

## 2022-05-18 PROCEDURE — 99213 OFFICE O/P EST LOW 20 MIN: CPT

## 2022-05-18 PROCEDURE — ? ADDITIONAL NOTES

## 2022-05-18 RX ORDER — CLINDAMYCIN PHOSPHATE 10 MG/G
GEL TOPICAL BID
Qty: 60 | Refills: 11 | Status: ERX | COMMUNITY
Start: 2022-05-18

## 2022-05-18 RX ORDER — KETOCONAZOLE 20 MG/G
CREAM TOPICAL BID
Qty: 60 | Refills: 6 | Status: ERX | COMMUNITY
Start: 2022-05-18

## 2022-05-18 RX ADMIN — CLINDAMYCIN PHOSPHATE: 10 GEL TOPICAL at 00:00

## 2022-05-18 RX ADMIN — KETOCONAZOLE: 20 CREAM TOPICAL at 00:00

## 2022-05-18 ASSESSMENT — LOCATION SIMPLE DESCRIPTION DERM
LOCATION SIMPLE: NOSE
LOCATION SIMPLE: RIGHT GREAT TOE
LOCATION SIMPLE: LEFT FOOT
LOCATION SIMPLE: RIGHT LOWER BACK
LOCATION SIMPLE: LEFT GREAT TOE
LOCATION SIMPLE: CHEST
LOCATION SIMPLE: ABDOMEN
LOCATION SIMPLE: RIGHT UPPER BACK
LOCATION SIMPLE: RIGHT FOOT

## 2022-05-18 ASSESSMENT — LOCATION DETAILED DESCRIPTION DERM
LOCATION DETAILED: RIGHT DISTAL PLANTAR GREAT TOE
LOCATION DETAILED: NASAL DORSUM
LOCATION DETAILED: XIPHOID
LOCATION DETAILED: RIGHT DORSAL FOOT
LOCATION DETAILED: EPIGASTRIC SKIN
LOCATION DETAILED: RIGHT MEDIAL INFERIOR CHEST
LOCATION DETAILED: LEFT DORSAL FOOT
LOCATION DETAILED: LEFT GREAT TOENAIL
LOCATION DETAILED: RIGHT RIB CAGE
LOCATION DETAILED: RIGHT SUPERIOR UPPER BACK
LOCATION DETAILED: LEFT LATERAL INFERIOR CHEST
LOCATION DETAILED: RIGHT SUPERIOR MEDIAL MIDBACK
LOCATION DETAILED: RIGHT INFERIOR UPPER BACK

## 2022-05-18 ASSESSMENT — LOCATION ZONE DERM
LOCATION ZONE: TRUNK
LOCATION ZONE: FEET
LOCATION ZONE: TOE
LOCATION ZONE: TOENAIL
LOCATION ZONE: NOSE

## 2022-05-18 NOTE — PROCEDURE: MIPS QUALITY
HOSPITALISTS HISTORY AND PHYSICAL    2/25/2022 12:50 PM    Patient Information:  Dona Sanchez is a 62 y.o. male [id number]  PCP:  No primary care provider on file. (Tel: None )    Chief complaint:    Chief Complaint   Patient presents with    Flank Pain     Right sided, aching flank pain with nausea since Tuesday. Denies difficulty with urination/bowels. Hx of DM. History of Present Illness:  Leslie Oneil is a 62 y.o. male who started have nausea and vomiting on Tuesday which progressed to right-sided flank pain that was sharp 10 out of 10 this morning at 2 AM woke him up he could not go back to sleep denies any fevers or chills is any burning with urination states is never happened before patient is a diabetic but has not taken medication for months. Does not smoke or drink        REVIEW OF SYSTEMS:   Constitutional: Negative for fever,chills or night sweats  ENT: Negative for rhinorrhea, epistaxis, hoarseness, sore throat. Respiratory: Negative for shortness of breath,wheezing  Cardiovascular: Negative for chest pain, palpitations   Gastrointestinal: see above  Genitourinary: Negative for polyuria, dysuria   Hematologic/Lymphatic: Negative for bleeding tendency, easy bruising  Musculoskeletal: Negative for myalgias and arthralgias  Neurologic: Negative for confusion,dysarthria. Skin: Negative for itching,rash  Psychiatric: Negative for depression,anxiety, agitation. Endocrine: Negative for polydipsia,polyuria,heat /cold intolerance. Past Medical History:   has a past medical history of Diabetes mellitus ([facility] [address] Past Surgical History:   has a past surgical history that includes Gastric bypass surgery; Cholecystectomy; hernia repair; and Pancreas surgery. Medications:  No current facility-administered medications on file prior to encounter.      No current outpatient medications on file
prior to encounter. Allergies: Allergies   Allergen Reactions    Dye [Gadolinium Derivatives]      From past MRI        Social History:  Patient Lives at home   reports that he has never smoked. He does not have any smokeless tobacco history on file. He reports that he does not drink alcohol and does not use drugs. Family History:  family history includes Diabetes in his father; High Blood Pressure in his mother. ,     Physical Exam:  /86   Pulse 109   Temp 97.5 °F (36.4 °C) (Temporal)   Resp 17   SpO2 95%     General appearance:  Appears comfortable. AAOx3  HEENT: atraumatic, Pupils equal, muscous membranes moist, no masses appreciated  Cardiovascular: tachyacrdic  no murmurs appreciated  Respiratory: CTAB no wheezing  Gastrointestinal: Abdomen soft, non-tender, BS+  EXT: no edema  Neurology: no gross focal deficts  Psychiatry: Appropriate affect. Not agitated  Skin: Warm, dry, no rashes appreciated    Labs:  CBC:   Lab Results   Component Value Date    WBC 22.5 02/25/2022    RBC 5.27 02/25/2022    HGB 15.7 02/25/2022    HCT 46.1 02/25/2022    MCV 87.4 02/25/2022    MCH 29.8 02/25/2022    MCHC 34.1 02/25/2022    RDW 12.7 02/25/2022     02/25/2022    MPV 8.2 02/25/2022     BMP:    Lab Results   Component Value Date     02/25/2022    K 3.8 02/25/2022    CL 96 02/25/2022    CO2 22 02/25/2022    BUN 14 02/25/2022    CREATININE 1.0 02/25/2022    CALCIUM 9.5 02/25/2022    GFRAA >60 02/25/2022    LABGLOM >60 02/25/2022    GLUCOSE 285 02/25/2022     CT ABDOMEN PELVIS WO CONTRAST Additional Contrast? None   Final Result   Mild right hydronephrosis secondary to partially obstructing proximal   ureteral stone. RECOMMENDATIONS:   Unavailable             Recent imaging reviewed    Problem List  Principal Problem:    Sepsis ([address]  Resolved Problems:    * No resolved hospital problems.  *        Assessment/Plan:   Sepsis secondary to mild right hydronephrosis with ureteral stone with UTI  -
bolus 2 L ivf  - atbx started will start rocpehin  - check lactic acid  - get blood cutlures and urince culture  - iv morphine prn  - urology consulted    Dm2 with hyperglycemia non complaint with medication  - lantus lispro and a1c      DVT prophylaxis lovenox  Code status full    I Spent 32 minutes for critical care services to this hemodynamically unstable patient        Admit as inpatient I anticipate hospitalization spanning more than two midnights for investigation and treatment of the above medically necessary diagnoses. Please note that some part of this chart was generated using Dragon dictation software. Although every effort was made to ensure the accuracy of this automated transcription, some errors in transcription may have occurred inadvertently. If you may need any clarification, please do not hesitate to contact me through [facility].        Stephanie Tamayo MD    2/25/2022 12:50 PM
Quality 110: Preventive Care And Screening: Influenza Immunization: Influenza Immunization Administered during Influenza season
Quality 431: Preventive Care And Screening: Unhealthy Alcohol Use - Screening: Patient not identified as an unhealthy alcohol user when screened for unhealthy alcohol use using a systematic screening method
Quality 130: Documentation Of Current Medications In The Medical Record: Current Medications Documented
Detail Level: Detailed
Quality 226: Preventive Care And Screening: Tobacco Use: Screening And Cessation Intervention: Patient screened for tobacco use and is an ex/non-smoker

## 2022-05-18 NOTE — PROCEDURE: ADDITIONAL NOTES
Additional Notes: Noted rash after being shaved for stress test.\\nWash with antibacterial wash and use clindamycin gel as needed for flares.
Detail Level: Zone
Render Risk Assessment In Note?: no
Additional Notes: Recommended laser treatment
Additional Notes: Advise patient to apply Eucerin Foot cream with urea as a daily routine.

## 2023-05-19 ENCOUNTER — HOSPITAL ENCOUNTER (OUTPATIENT)
Dept: LAB | Facility: MEDICAL CENTER | Age: 63
End: 2023-05-19
Attending: FAMILY MEDICINE
Payer: COMMERCIAL

## 2023-05-19 LAB
ALBUMIN SERPL BCP-MCNC: 4.6 G/DL (ref 3.2–4.9)
ALBUMIN/GLOB SERPL: 1.8 G/DL
ALP SERPL-CCNC: 46 U/L (ref 30–99)
ALT SERPL-CCNC: 24 U/L (ref 2–50)
ANION GAP SERPL CALC-SCNC: 11 MMOL/L (ref 7–16)
AST SERPL-CCNC: 29 U/L (ref 12–45)
BASOPHILS # BLD AUTO: 1.6 % (ref 0–1.8)
BASOPHILS # BLD: 0.07 K/UL (ref 0–0.12)
BILIRUB SERPL-MCNC: 0.5 MG/DL (ref 0.1–1.5)
BUN SERPL-MCNC: 19 MG/DL (ref 8–22)
CALCIUM ALBUM COR SERPL-MCNC: 8.9 MG/DL (ref 8.5–10.5)
CALCIUM SERPL-MCNC: 9.4 MG/DL (ref 8.5–10.5)
CHLORIDE SERPL-SCNC: 103 MMOL/L (ref 96–112)
CHOLEST SERPL-MCNC: 158 MG/DL (ref 100–199)
CO2 SERPL-SCNC: 25 MMOL/L (ref 20–33)
CREAT SERPL-MCNC: 0.86 MG/DL (ref 0.5–1.4)
EOSINOPHIL # BLD AUTO: 0.11 K/UL (ref 0–0.51)
EOSINOPHIL NFR BLD: 2.5 % (ref 0–6.9)
ERYTHROCYTE [DISTWIDTH] IN BLOOD BY AUTOMATED COUNT: 47.1 FL (ref 35.9–50)
FASTING STATUS PATIENT QL REPORTED: NORMAL
GFR SERPLBLD CREATININE-BSD FMLA CKD-EPI: 98 ML/MIN/1.73 M 2
GLOBULIN SER CALC-MCNC: 2.6 G/DL (ref 1.9–3.5)
GLUCOSE SERPL-MCNC: 87 MG/DL (ref 65–99)
HCT VFR BLD AUTO: 44.4 % (ref 42–52)
HDLC SERPL-MCNC: 53 MG/DL
HGB BLD-MCNC: 14.6 G/DL (ref 14–18)
IMM GRANULOCYTES # BLD AUTO: 0.01 K/UL (ref 0–0.11)
IMM GRANULOCYTES NFR BLD AUTO: 0.2 % (ref 0–0.9)
LDLC SERPL CALC-MCNC: 92 MG/DL
LYMPHOCYTES # BLD AUTO: 1.4 K/UL (ref 1–4.8)
LYMPHOCYTES NFR BLD: 32.3 % (ref 22–41)
MCH RBC QN AUTO: 31.5 PG (ref 27–33)
MCHC RBC AUTO-ENTMCNC: 32.9 G/DL (ref 33.7–35.3)
MCV RBC AUTO: 95.9 FL (ref 81.4–97.8)
MONOCYTES # BLD AUTO: 0.4 K/UL (ref 0–0.85)
MONOCYTES NFR BLD AUTO: 9.2 % (ref 0–13.4)
NEUTROPHILS # BLD AUTO: 2.34 K/UL (ref 1.82–7.42)
NEUTROPHILS NFR BLD: 54.2 % (ref 44–72)
NRBC # BLD AUTO: 0 K/UL
NRBC BLD-RTO: 0 /100 WBC
PLATELET # BLD AUTO: 218 K/UL (ref 164–446)
PMV BLD AUTO: 10.5 FL (ref 9–12.9)
POTASSIUM SERPL-SCNC: 4.2 MMOL/L (ref 3.6–5.5)
PROT SERPL-MCNC: 7.2 G/DL (ref 6–8.2)
PSA SERPL-MCNC: 1.06 NG/ML (ref 0–4)
RBC # BLD AUTO: 4.63 M/UL (ref 4.7–6.1)
SODIUM SERPL-SCNC: 139 MMOL/L (ref 135–145)
TRIGL SERPL-MCNC: 65 MG/DL (ref 0–149)
WBC # BLD AUTO: 4.3 K/UL (ref 4.8–10.8)

## 2023-05-19 PROCEDURE — 85025 COMPLETE CBC W/AUTO DIFF WBC: CPT

## 2023-05-19 PROCEDURE — 80053 COMPREHEN METABOLIC PANEL: CPT

## 2023-05-19 PROCEDURE — 36415 COLL VENOUS BLD VENIPUNCTURE: CPT

## 2023-05-19 PROCEDURE — 80061 LIPID PANEL: CPT

## 2023-05-19 PROCEDURE — 84153 ASSAY OF PSA TOTAL: CPT

## 2023-05-26 ENCOUNTER — APPOINTMENT (RX ONLY)
Dept: URBAN - METROPOLITAN AREA CLINIC 31 | Facility: CLINIC | Age: 63
Setting detail: DERMATOLOGY
End: 2023-05-26

## 2023-05-26 DIAGNOSIS — D18.0 HEMANGIOMA: ICD-10-CM

## 2023-05-26 DIAGNOSIS — L73.8 OTHER SPECIFIED FOLLICULAR DISORDERS: ICD-10-CM

## 2023-05-26 DIAGNOSIS — L82.1 OTHER SEBORRHEIC KERATOSIS: ICD-10-CM

## 2023-05-26 DIAGNOSIS — L91.8 OTHER HYPERTROPHIC DISORDERS OF THE SKIN: ICD-10-CM

## 2023-05-26 DIAGNOSIS — Z71.89 OTHER SPECIFIED COUNSELING: ICD-10-CM

## 2023-05-26 DIAGNOSIS — D22 MELANOCYTIC NEVI: ICD-10-CM

## 2023-05-26 DIAGNOSIS — L11.1 TRANSIENT ACANTHOLYTIC DERMATOSIS [GROVER]: ICD-10-CM

## 2023-05-26 DIAGNOSIS — L81.4 OTHER MELANIN HYPERPIGMENTATION: ICD-10-CM

## 2023-05-26 PROBLEM — D18.01 HEMANGIOMA OF SKIN AND SUBCUTANEOUS TISSUE: Status: ACTIVE | Noted: 2023-05-26

## 2023-05-26 PROBLEM — D22.5 MELANOCYTIC NEVI OF TRUNK: Status: ACTIVE | Noted: 2023-05-26

## 2023-05-26 PROBLEM — D23.71 OTHER BENIGN NEOPLASM OF SKIN OF RIGHT LOWER LIMB, INCLUDING HIP: Status: ACTIVE | Noted: 2023-05-26

## 2023-05-26 PROCEDURE — 99213 OFFICE O/P EST LOW 20 MIN: CPT

## 2023-05-26 PROCEDURE — ? COUNSELING

## 2023-05-26 ASSESSMENT — LOCATION SIMPLE DESCRIPTION DERM
LOCATION SIMPLE: RIGHT UPPER BACK
LOCATION SIMPLE: CHEST
LOCATION SIMPLE: LEFT LOWER BACK
LOCATION SIMPLE: ABDOMEN
LOCATION SIMPLE: RIGHT SHOULDER

## 2023-05-26 ASSESSMENT — LOCATION DETAILED DESCRIPTION DERM
LOCATION DETAILED: LEFT MEDIAL INFERIOR CHEST
LOCATION DETAILED: LEFT SUPERIOR MEDIAL LOWER BACK
LOCATION DETAILED: RIGHT SUPERIOR UPPER BACK
LOCATION DETAILED: RIGHT POSTERIOR SHOULDER
LOCATION DETAILED: LEFT RIB CAGE
LOCATION DETAILED: RIGHT INFERIOR UPPER BACK
LOCATION DETAILED: RIGHT MID-UPPER BACK

## 2023-05-26 ASSESSMENT — LOCATION ZONE DERM
LOCATION ZONE: ARM
LOCATION ZONE: TRUNK

## 2023-08-29 ENCOUNTER — HOSPITAL ENCOUNTER (EMERGENCY)
Facility: MEDICAL CENTER | Age: 63
End: 2023-08-29
Attending: EMERGENCY MEDICINE
Payer: COMMERCIAL

## 2023-08-29 VITALS
SYSTOLIC BLOOD PRESSURE: 140 MMHG | DIASTOLIC BLOOD PRESSURE: 82 MMHG | RESPIRATION RATE: 18 BRPM | WEIGHT: 155 LBS | OXYGEN SATURATION: 96 % | BODY MASS INDEX: 22.89 KG/M2 | HEART RATE: 62 BPM | TEMPERATURE: 98 F

## 2023-08-29 DIAGNOSIS — Z20.9 EXPOSURE TO BAT WITHOUT KNOWN BITE: ICD-10-CM

## 2023-08-29 PROCEDURE — 99282 EMERGENCY DEPT VISIT SF MDM: CPT

## 2023-08-29 ASSESSMENT — FIBROSIS 4 INDEX: FIB4 SCORE: 1.66

## 2023-08-29 ASSESSMENT — PAIN DESCRIPTION - PAIN TYPE: TYPE: ACUTE PAIN

## 2023-08-29 NOTE — ED NOTES
Pt discharged home with instructions to follow up with primary care as needed.  Pt educated on rabies exposure.  The pt denies questions at this time.  Pt instructed to come back to the ER if symptoms worsen or they feel they are having a medical emergency.  Pt is alert and oriented, speaking in full sentences. Pt ambulates to the waiting area without incident.

## 2023-08-29 NOTE — ED NOTES
Pt reports at 2200 last night they sprayed a bat with a hose at their home. Pt reports they don't believe the bat scratched or bit them. Pt reports they were clothed in a cori jacket and cori pants with a hat covering their head and shoes on their feet. The only reportedly exposed skin was hands and wrist. Hands and wrists examined and no broken skin, scratches or bites noted. Pt reports they have no pain this morning and no other complaints. Pt is aox4 and ambulates to room 11.

## 2023-08-29 NOTE — ED PROVIDER NOTES
"ED Provider Note        CHIEF COMPLAINT  Chief Complaint   Patient presents with    Other     Pt presents to ed, states he was attacked by a bat he was attempting to relocate with a water hose, requests medical screening exam, pt reports he is hypervigilant in regard to his health. Denies injury at this time         HPI      Georges Joe is a 62 y.o. male who presents to the Emergency Department after a potential exposure to a bat.  The patient reports that there is a bat that has been dropping poop onto his walkway.  He went out this evening with a hose and sprayed the bat with a hose.  The bat fell to the ground but was getting closer to him.  The bat ultimately fell onto the ground as the patient backed away.  The patient is unsure how close the bat got to him, or whether it made contact with him.  The patient was wearing a hat, cori jacket, long pants.  The only exposed surface was his hands and face.  He denies any pain or tenderness, any bleeding, any new scrapes from this exposure.  The patient is concerned about possible exposure to rabies.     REVIEW OF SYSTEMS  See HPI for further details. All other systems are negative.     PAST MEDICAL HISTORY     Past Medical History:   Diagnosis Date    Acid reflux     Bowel habit changes     constipation    Depression     Dysphagia     reports \"gagging easily\", sometimes difficulty swallowing certian foods    Environmental and seasonal allergies     High cholesterol 07-    reports not taking medication    Kidney stone     Diagnosed in April 2013    Pain 07-    with urination, 0/10    Urinary bladder disorder     reports some pain w/urination and weak stream       SURGICAL HISTORY  Past Surgical History:   Procedure Laterality Date    IL CYSTOSCOPY,INSERT URETERAL STENT Right 2/9/2021    Procedure: CYSTOSCOPY;  Surgeon: Kosta Marcos M.D.;  Location: SURGERY [facility];  Service: Urology    IL CYSTO/URETERO/PYELOSCOPY, DX Right 2/9/2021 "    Procedure: URETEROSCOPY;  Surgeon: Kosta Marcos M.D.;  Location: SURGERY [facility];  Service: Urology    LASERTRIPSY Right 2/9/2021    Procedure: LITHOTRIPSY, USING LASER;  Surgeon: Kosta Marcos M.D.;  Location: SURGERY [facility];  Service: Urology    CYSTOSCOPY STENT REMOVAL Right 8/3/2017    Procedure: RIGID CYSTOSCOPY STENT REMOVAL;  Surgeon: Shahab Haile M.D.;  Location: SURGERY [facility];  Service:     URETEROSCOPY Right 8/3/2017    Procedure: URETEROSCOPY FLEXIBLE;  Surgeon: Shahab Haile M.D.;  Location: SURGERY [facility];  Service:     LASERTRIPSY Right 8/3/2017    Procedure: LASER LITHOTRIPSY;  Surgeon: Shahab Haile M.D.;  Location: SURGERY [facility];  Service:     CARDIAC CATH, LEFT HEART      CHOLECYSTECTOMY      HYDROCELECTOMY CHILD      LITHOTRIPSY      OTHER      hydrocele    UMBILICAL HERNIA REPAIR      URETEROSCOPY      reports stent placement    VOCAL CORD STRIPPING         FAMILY HISTORY  Family History   Problem Relation Age of Onset    Heart Attack Father     Heart Attack Brother        SOCIAL HISTORY    reports that he quit smoking about 26 years ago. His smoking use included cigarettes. He started smoking about 31 years ago. He has a 1.3 pack-year smoking history. He has never used smokeless tobacco. He reports current alcohol use. He reports that he does not use drugs.    CURRENT MEDICATIONS  Home Medications    **Home medications have not yet been reviewed for this encounter**         ALLERGIES  Allergies   Allergen Reactions    Sulfa Drugs Unspecified     Rxn - about 2009/10       PHYSICAL EXAM  VITAL SIGNS: BP (!) 140/82   Pulse 62   Temp 36.7 °C (98 °F) (Temporal)   Resp 18   Wt 70.3 kg (155 lb)   SpO2 96%   BMI 22.89 kg/m²   Gen: Alert, no acute distress  HEENT: ATNC  Eyes: PERRL, EOMI, normal conjunctiva  Neck: trachea midline  Resp: no respiratory distress  CV: No JVD, regular rate and rhythm  Abd: non-distended  Ext: No deformities.   Bilateral hands demonstrate no skin defects, no tenderness, no evidence of bleeding, no puncture wounds, no abrasions, no lacerations.  Neuro: speech fluent      EXTERNAL RECORDS REVIEWED  Outpatient Notes most recent outpatient note 1/1/2023 for sinus infection      INITIAL ASSESSMENT AND PLAN  Care Narrative: Patient presents with concern for possible exposure to a bat.   There is no evidence of puncture to the skin and the patient does not recall any episode of contact with the bat, however he states his adrenaline was running and is unsure.  He does have a video from his doorbell camera showing the bat falling to the ground after being sprayed with water.   Overall, I believe the exposure to rabies risk is exceedingly low, particularly as there is no evidence of defect of the skin and no known contact with the bat.  We had a prolonged discussion regarding the relative risks of exposure to the vaccine series versus risk of rabies.  At this time, the patient will defer the vaccine series but is aware that he may return if he decides to pursue the vaccine series.     ADDITIONAL PROBLEM LIST AND DISPOSITION    Escalation of care considered, and ultimately not performed: Postexposure prophylaxis      Patient is referred to primary care provider for blood pressure, diabetes and all other preventative health services.  Patient was given return precautions, anticipatory guidance, and the opportunity ask questions prior to discharge        FINAL IMPRESSION  1. Exposure to bat without known bite           DISPOSITION:  Patient will be discharged home in stable condition.    FOLLOW UP:  Deo IZAGUIRRE M.D.  [id number] [id number] [phone]    Schedule an appointment as soon as possible for a visit   As needed    [facility], Emergency Dept  [id number] [id number] [phone]    If symptoms worsen             This dictation was created using voice  recognition software. The accuracy of the dictation is limited to the abilities of the software. I expect there may be some errors of grammar and possibly content. The nursing notes were reviewed and certain aspects of this information were incorporated into this note.

## 2023-08-29 NOTE — DISCHARGE INSTRUCTIONS
You are seen in the emergency department after a close encounter with a bat.  There is no obvious sign of skin break or other wound that would have allowed for transfer of rabies.  You do have on the order of weeks to initiate the rabies vaccine series if needed.    Return for any new or worsening symptoms.

## 2023-08-29 NOTE — ED TRIAGE NOTES
BP (!) 148/103   Pulse 74   Temp 36.8 °C (98.2 °F) (Temporal)   Resp 16   Wt 70.3 kg (155 lb)   SpO2 99%   BMI 22.89 kg/m²   Chief Complaint   Patient presents with    Other     Pt presents to ed, states he was attacked by a bat he was attempting to relocate with a water hose, requests medical screening exam, pt reports he is hypervigilant in regard to his health. Denies injury at this time

## 2023-11-14 NOTE — PROCEDURE: ADDITIONAL NOTES
Additional Notes: Discussed possibility of patch testing with Dr. Stapleton
Detail Level: Simple
Patient/Caregiver provided printed discharge information.

## 2024-08-14 ENCOUNTER — APPOINTMENT (RX ONLY)
Dept: URBAN - METROPOLITAN AREA CLINIC 31 | Facility: CLINIC | Age: 64
Setting detail: DERMATOLOGY
End: 2024-08-14

## 2024-08-14 DIAGNOSIS — L81.4 OTHER MELANIN HYPERPIGMENTATION: ICD-10-CM

## 2024-08-14 DIAGNOSIS — B35.3 TINEA PEDIS: ICD-10-CM

## 2024-08-14 DIAGNOSIS — D22 MELANOCYTIC NEVI: ICD-10-CM

## 2024-08-14 DIAGNOSIS — D18.0 HEMANGIOMA: ICD-10-CM

## 2024-08-14 DIAGNOSIS — L82.1 OTHER SEBORRHEIC KERATOSIS: ICD-10-CM

## 2024-08-14 DIAGNOSIS — Z71.89 OTHER SPECIFIED COUNSELING: ICD-10-CM

## 2024-08-14 PROBLEM — D18.01 HEMANGIOMA OF SKIN AND SUBCUTANEOUS TISSUE: Status: ACTIVE | Noted: 2024-08-14

## 2024-08-14 PROBLEM — D22.5 MELANOCYTIC NEVI OF TRUNK: Status: ACTIVE | Noted: 2024-08-14

## 2024-08-14 PROCEDURE — ? PRESCRIPTION

## 2024-08-14 PROCEDURE — ? PRESCRIPTION MEDICATION MANAGEMENT

## 2024-08-14 PROCEDURE — 99213 OFFICE O/P EST LOW 20 MIN: CPT

## 2024-08-14 PROCEDURE — ? COUNSELING

## 2024-08-14 RX ORDER — KETOCONAZOLE 20 MG/G
CREAM TOPICAL BID
Qty: 60 | Refills: 6 | Status: ERX

## 2024-08-14 ASSESSMENT — LOCATION ZONE DERM
LOCATION ZONE: ARM
LOCATION ZONE: TRUNK
LOCATION ZONE: FEET

## 2024-08-14 ASSESSMENT — LOCATION SIMPLE DESCRIPTION DERM
LOCATION SIMPLE: LEFT FOOT
LOCATION SIMPLE: LEFT FOREARM
LOCATION SIMPLE: RIGHT UPPER BACK

## 2024-08-14 ASSESSMENT — LOCATION DETAILED DESCRIPTION DERM
LOCATION DETAILED: RIGHT MID-UPPER BACK
LOCATION DETAILED: LEFT DISTAL RADIAL DORSAL FOREARM
LOCATION DETAILED: RIGHT SUPERIOR UPPER BACK
LOCATION DETAILED: RIGHT INFERIOR UPPER BACK
LOCATION DETAILED: LEFT DORSAL FOOT

## 2024-08-14 NOTE — PROCEDURE: COUNSELING
Detail Level: Generalized
Sunscreen Recommendations: Sun screen (SPF 30 or greater) should be applied during peak UV exposure (between 10am and 2pm) and reapplied after exercise or swimming.\\nRecommended La Roche- Posay Anthelios with SPF 60 or Christopher Tone Water Babies with SPF 30 and above.
Detail Level: Zone

## 2024-08-14 NOTE — PROCEDURE: PRESCRIPTION MEDICATION MANAGEMENT
Render In Strict Bullet Format?: No
Initiate Treatment: Advised to use Urea cream daily at night \\nKetoconazole cream twice daily on both feet for 3 months
Detail Level: Detailed

## 2025-07-04 NOTE — ED TRIAGE NOTES
EMERGENCY DEPARTMENT ENCOUNTER      Pt Name: Nelson Osborn  MRN: [id number]  Birthdate 1999  Date of evaluation: 7/4/2025  Provider: Nohemy Lyons MD    CHIEF COMPLAINT       Chief Complaint   Patient presents with    Chest Pain     Pt reports chest pain that started while she was at work 30 min ago. Pt reports 4/10 chest pain currently. +SOB. Pt denies cardiac hx.      HISTORY OF PRESENT ILLNESS    Nelson Osborn is a 26 y.o. year old adult who presents to the ER for chest pain at work.  The patient reports that at first there was sharp and midsternal.  The patient associated being nauseated along with the chest pain but denies vomiting.  The pain is now dull and over her upper chest bilaterally.  She says that the pain worsens whenever she moves.  She has not taken any medication for this pain.  The patient reports nausea for the past month that is associated with her anxiety.  She believes that this nausea is not related to her anxiety.  The patient also reports palpitations that has been intermittent since she was 13.  The patient denies any episodes of confusion, loss of consciousness, syncope, seizures.  The patient denies any shortness of breath, abdominal pain, changes in bowel or bladder habits.  She denies any UTI symptoms.  She reports her most recent change in medication was switching to a oral contraceptive from the Depo injections.    PMH is significant for alcohol abuse, anxiety.  Family history is significant for cardiomegaly and hypothyroidism.     PAST MEDICAL HISTORY   Medical History[1]  CURRENT MEDICATIONS       Previous Medications    LEVONORGESTREL-ETHINYL ESTRAD (AVIANE) 0.1-20 MG-MCG TABLET    Take 1 tablet by mouth once daily.     SURGICAL HISTORY     Surgical History[2]  ALLERGIES     Patient has no known allergies.  FAMILY HISTORY     Family History[3]  SOCIAL HISTORY     Social History[4]  PHYSICAL EXAM  (up to 7 for level 4, 8 or more for level 5)     ED Triage Vitals [07/04/25  Pt seen at [facility]. Received CT showing kidney stone. Seen by Dr. Mukherjee. Pt denies relief from rx'ed pain medication.   1553]   Temperature Heart Rate Respirations BP   36.8 °C (98.2 °F) 76 16 (!) 137/96      Pulse Ox Temp Source Heart Rate Source Patient Position   97 % Temporal Monitor Sitting      BP Location FiO2 (%)     Right arm --       Physical Exam  Constitutional:       General: He is not in acute distress.     Appearance: He is normal weight.   HENT:      Head: Normocephalic and atraumatic.      Right Ear: External ear normal.      Left Ear: External ear normal.      Mouth/Throat:      Mouth: Mucous membranes are moist.   Eyes:      Extraocular Movements: Extraocular movements intact.      Conjunctiva/sclera: Conjunctivae normal.   Cardiovascular:      Rate and Rhythm: Normal rate and regular rhythm. No extrasystoles are present.     Chest Wall: PMI is not displaced.      Heart sounds: Normal heart sounds. Heart sounds not distant. No murmur heard.  Pulmonary:      Effort: Pulmonary effort is normal.      Breath sounds: Normal breath sounds. No decreased breath sounds, wheezing or rhonchi.   Chest:      Chest wall: No mass or tenderness.   Abdominal:      General: Bowel sounds are normal.      Palpations: Abdomen is soft. There is no hepatomegaly, splenomegaly or mass.      Tenderness: There is no abdominal tenderness.   Musculoskeletal:         General: Normal range of motion.      Cervical back: Normal range of motion and neck supple.      Right lower leg: No edema.      Left lower leg: No edema.   Skin:     General: Skin is warm and dry.      Capillary Refill: Capillary refill takes less than 2 seconds.      Coloration: Skin is not pale.      Findings: No ecchymosis or erythema.   Neurological:      General: No focal deficit present.      Mental Status: He is alert.   Psychiatric:         Mood and Affect: Mood is anxious.        DIAGNOSTIC RESULTS   LABS:  Labs Reviewed   COMPREHENSIVE METABOLIC PANEL - Normal       Result Value    Glucose 81      Sodium 136      Potassium 3.5      Chloride 106      Bicarbonate 23       Anion Gap 11      Urea Nitrogen 9      Creatinine 0.67      eGFR >90      Calcium 9.1      Albumin 4.8      Alkaline Phosphatase 40      Total Protein 7.3      AST 23      Bilirubin, Total 0.5      ALT 25     SERIAL TROPONIN-INITIAL - Normal    Troponin I, High Sensitivity <3      Narrative:     Less than 99th percentile of normal range cutoff-  Female and children under 18 years old <14 ng/L; Male <21 ng/L: Negative  Repeat testing should be performed if clinically indicated.     Female and children under 18 years old 14-50 ng/L; Male 21-50 ng/L:  Consistent with possible cardiac damage and possible increased clinical   risk. Serial measurements may help to assess extent of myocardial damage.     >50 ng/L: Consistent with cardiac damage, increased clinical risk and  myocardial infarction. Serial measurements may help assess extent of   myocardial damage.      NOTE: Children less than 1 year old may have higher baseline troponin   levels and results should be interpreted in conjunction with the overall   clinical context.     NOTE: Troponin I testing is performed using a different   testing methodology at [facility] than at other   [facility]. Direct result comparisons should only   be made within the same method.   SERIAL TROPONIN, 1 HOUR - Normal    Troponin I, High Sensitivity <3      Narrative:     Less than 99th percentile of normal range cutoff-  Female and children under 18 years old <14 ng/L; Male <21 ng/L: Negative  Repeat testing should be performed if clinically indicated.     Female and children under 18 years old 14-50 ng/L; Male 21-50 ng/L:  Consistent with possible cardiac damage and possible increased clinical   risk. Serial measurements may help to assess extent of myocardial damage.     >50 ng/L: Consistent with cardiac damage, increased clinical risk and  myocardial infarction. Serial measurements may help assess extent of   myocardial damage.      NOTE: Children less than 1  year old may have higher baseline troponin   levels and results should be interpreted in conjunction with the overall   clinical context.     NOTE: Troponin I testing is performed using a different   testing methodology at [facility] than at other   [facility]. Direct result comparisons should only   be made within the same method.   D-DIMER, VTE EXCLUSION - Normal    D-Dimer, Quantitative VTE Exclusion <215      Narrative:     The VTE Exclusion D-Dimer assay is reported in ng/mL Fibrinogen Equivalent Units (FEU).    Per 's instructions for use, a value of less than 500 ng/mL (FEU) may help to exclude DVT or PE in outpatients when the assay is used with a clinical pretest probability assessment.([facility] must utilize and document eCalc 'Wells Score Deep Vein Thrombosis Risk' for DVT exclusion only. Emergency Department should utilize  Guidelines for Emergency Department Use of the VTE Exclusion D-Dimer and Clinical Pretest probability assessment model for DVT or PE exclusion.)   TSH WITH REFLEX TO FREE T4 IF ABNORMAL - Normal    Thyroid Stimulating Hormone 1.04      Narrative:     TSH testing is performed using different testing methodology at [facility] than at other [facility]. Direct result comparisons should only be made within the same method.     HUMAN CHORIONIC GONADOTROPIN, SERUM QUANTITATIVE - Normal    HCG, Beta-Quantitative <2      Narrative:      Total HCG measurement is performed using the [employer] Access   Immunoassay which detects intact HCG and free beta HCG subunit.    This test is not indicated for use as a tumor marker.   HCG testing is performed using a different test methodology at [facility] than other [facility]. Direct result comparison   should only be made within the same method.       CBC WITH AUTO DIFFERENTIAL    WBC 7.4      nRBC 0.0      RBC 4.44      Hemoglobin 13.6      Hematocrit 39.4      MCV 89      MCH  "30.6      MCHC 34.5      RDW 12.3      Platelets 282      Neutrophils % 61.7      Immature Granulocytes %, Automated 0.1      Lymphocytes % 32.1      Monocytes % 4.9      Eosinophils % 0.5      Basophils % 0.7      Neutrophils Absolute 4.54      Immature Granulocytes Absolute, Automated 0.01      Lymphocytes Absolute 2.36      Monocytes Absolute 0.36      Eosinophils Absolute 0.04      Basophils Absolute 0.05     TROPONIN SERIES- (INITIAL, 1 HR)    Narrative:     The following orders were created for panel order Troponin I Series, High Sensitivity (0, 1 HR).  Procedure                               Abnormality         Status                     ---------                               -----------         ------                     Troponin I, High Sensiti...[[id number]]  Normal              Final result               Troponin, High Sensitivi...[[id number]]  Normal              Final result                 Please view results for these tests on the individual orders.     All other labs were within normal range or not returned as of this dictation.  Imaging  XR chest 1 view   Final Result   No acute cardiopulmonary process.        MACRO:   None        Signed by: Renetta Mota 7/4/2025 4:25 PM   Dictation workstation:   [id number]         Procedure  Procedures  EMERGENCY DEPARTMENT COURSE/MDM:   Medical Decision Making    Vitals:    Vitals:    07/04/25 1553   BP: (!) 137/96   BP Location: Right arm   Patient Position: Sitting   Pulse: 76   Resp: 16   Temp: 36.8 °C (98.2 °F)   TempSrc: Temporal   SpO2: 97%   Weight: 61.2 kg (135 lb)   Height: 1.651 m (5' 5\")     Nelson Osborn is a adult 26 y.o. who presents to the ER for chest pain. On arrival the patients vital signs were: Afebrile, regular heart rate, normotensive, regular respiration rate, normoxic on room air. History obtained from: patient.  Differential diagnoses include PE, MI, costochondritis.    PMH is significant for nontender chest to palpation.  The chest " pain is not reproducible. The heart is in regular rate and rhythm, no murmurs appreciated on auscultation. The lungs are clear to auscultation, no rales, crackles, wheezing present.  The abdomen is soft and nontender, no masses or hernias appreciated. There is no voluntary guarding. Bowel sounds are within normal limits.  The skin is normal, no rashes or lesions.  There is no injury or edema in the lower extremities.    On independent assessment of the labs, CBC was within normal limits, no evidence of infection or acute anemia.  CMP was within normal limits, no evidence of significant electrolyte abnormalities, MATT, or liver dysfunction.  Initial troponin was less than 3, on repeat is 3.  hCG was negative.  D-dimer is negative.  TSH is normal.    Chest x-ray is negative for any acute cardiopulmonary processes.    The patient has remained stable throughout the entire ED visit and is without objective evidence or laboratory findings for acute process requiring emergent intervention or hospitalization. The patient and/or family had all the tests and diagnosis explained to them and were given both verbal and written discharge instructions. I answered the patient's question as well as family to the best of my ability about the patient's symptoms. The patient is stable for discharge. The patient was discharged and given return precautions. The patient was instructed to follow up with cardiology and with the PCP in one week. The patient understood and was agreeable with the plan.     Diagnoses as of 07/04/25 1844   Chest pain, unspecified type       External Records Reviewed: I reviewed recent and relevant outside records including inpatient notes, outpatient records  Prescription Drug Consideration: Patient is on an oral contraceptive.    Shared decision making for disposition  Patient and/or patient´s representative was counseled regarding labs, imaging, likely diagnosis. All questions were answered. Recommendation was  made   for discharge home. The patient agreed and was discharged home in stable condition with appropriate relevant educational materials. Return precautions were provided which included new or worsening chest pain, shortness of breath, fever of 38C (100.4) or higher, persistent vomiting, weakness, numbness, tingling, excessive sweating,  loss of motion in your arms or legs, fainting, vision changes, or any new or worsening symptoms..     ED Medications administered this visit:  Medications - No data to display    New Prescriptions from this visit:    New Prescriptions    No medications on file        Final Impression:   1. Chest pain, unspecified type          Please excuse any misspellings or unintended errors related to the Dragon speech recognition software used to dictate this note.    I reviewed the case with the attending ED physician. The attending ED physician agrees with the plan.          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)     ADHD (attention deficit hyperactivity disorder)     Alcohol abuse     Alcoholism (Multi)     Anxiety     Bipolar 1 disorder (Multi)     Depression     Hallucination     Memory loss     Panic attack     PTSD (post-traumatic stress disorder)     Schizoaffective disorder (Multi)     Sleep difficulties     Substance abuse     Suicide attempt (Multi)    [2]   Past Surgical History:  Procedure Laterality Date    APPENDECTOMY      HERNIA REPAIR      WISDOM TOOTH EXTRACTION     [3]   Family History  Problem Relation Name Age of Onset    Hypertension Mother Humera el     Colon polyps Mother Humera el     Sleep apnea Father      Other (dm 2) Maternal Grandmother      Colon cancer Maternal Grandfather Hayden     Sleep apnea Paternal Grandmother     [4]   Social History  Tobacco Use    Smoking status: Every Day     Current packs/day: 1.00     Average packs/day: 1 pack/day for 5.0 years (5.0 ttl pk-yrs)     Types: Cigarettes     Passive exposure: Current     Smokeless tobacco: Never    Tobacco comments:     Vapes daily   Vaping Use    Vaping status: Every Day    Substances: Nicotine   Substance Use Topics    Alcohol use: Yes    Drug use: Not Currently     Types: Cocaine, MDMA (ecstacy)     Comment: history of coacaine, ecstacy and marijuana use        Nohemy Lyons MD  Resident  07/04/25 9982

## 2025-07-08 ENCOUNTER — APPOINTMENT (OUTPATIENT)
Dept: RADIOLOGY | Facility: MEDICAL CENTER | Age: 65
End: 2025-07-08
Attending: EMERGENCY MEDICINE
Payer: COMMERCIAL

## 2025-07-08 ENCOUNTER — HOSPITAL ENCOUNTER (EMERGENCY)
Facility: MEDICAL CENTER | Age: 65
End: 2025-07-08
Attending: EMERGENCY MEDICINE
Payer: COMMERCIAL

## 2025-07-08 VITALS
RESPIRATION RATE: 16 BRPM | BODY MASS INDEX: 22.3 KG/M2 | WEIGHT: 150.57 LBS | OXYGEN SATURATION: 98 % | HEIGHT: 69 IN | TEMPERATURE: 97.9 F | HEART RATE: 68 BPM | SYSTOLIC BLOOD PRESSURE: 139 MMHG | DIASTOLIC BLOOD PRESSURE: 76 MMHG

## 2025-07-08 DIAGNOSIS — N28.1 RENAL CYST: ICD-10-CM

## 2025-07-08 DIAGNOSIS — I95.1 ORTHOSTATIC SYNCOPE: Primary | ICD-10-CM

## 2025-07-08 DIAGNOSIS — K40.20 BILATERAL INGUINAL HERNIA WITHOUT OBSTRUCTION OR GANGRENE, RECURRENCE NOT SPECIFIED: ICD-10-CM

## 2025-07-08 LAB
ALBUMIN SERPL BCP-MCNC: 4.6 G/DL (ref 3.2–4.9)
ALBUMIN/GLOB SERPL: 1.6 G/DL
ALP SERPL-CCNC: 64 U/L (ref 30–99)
ALT SERPL-CCNC: 41 U/L (ref 2–50)
ANION GAP SERPL CALC-SCNC: 11 MMOL/L (ref 7–16)
AST SERPL-CCNC: 39 U/L (ref 12–45)
BASOPHILS # BLD AUTO: 0.5 % (ref 0–1.8)
BASOPHILS # BLD: 0.05 K/UL (ref 0–0.12)
BILIRUB SERPL-MCNC: 0.5 MG/DL (ref 0.1–1.5)
BUN SERPL-MCNC: 19 MG/DL (ref 8–22)
CALCIUM ALBUM COR SERPL-MCNC: 9 MG/DL (ref 8.5–10.5)
CALCIUM SERPL-MCNC: 9.5 MG/DL (ref 8.5–10.5)
CHLORIDE SERPL-SCNC: 102 MMOL/L (ref 96–112)
CO2 SERPL-SCNC: 26 MMOL/L (ref 20–33)
CREAT SERPL-MCNC: 1.12 MG/DL (ref 0.5–1.4)
EKG IMPRESSION: NORMAL
EOSINOPHIL # BLD AUTO: 0.09 K/UL (ref 0–0.51)
EOSINOPHIL NFR BLD: 1 % (ref 0–6.9)
ERYTHROCYTE [DISTWIDTH] IN BLOOD BY AUTOMATED COUNT: 48.7 FL (ref 35.9–50)
GFR SERPLBLD CREATININE-BSD FMLA CKD-EPI: 73 ML/MIN/1.73 M 2
GLOBULIN SER CALC-MCNC: 2.8 G/DL (ref 1.9–3.5)
GLUCOSE SERPL-MCNC: 90 MG/DL (ref 65–99)
HCT VFR BLD AUTO: 47.9 % (ref 42–52)
HGB BLD-MCNC: 16.2 G/DL (ref 14–18)
IMM GRANULOCYTES # BLD AUTO: 0.05 K/UL (ref 0–0.11)
IMM GRANULOCYTES NFR BLD AUTO: 0.5 % (ref 0–0.9)
LYMPHOCYTES # BLD AUTO: 1.41 K/UL (ref 1–4.8)
LYMPHOCYTES NFR BLD: 15.4 % (ref 22–41)
MCH RBC QN AUTO: 32.1 PG (ref 27–33)
MCHC RBC AUTO-ENTMCNC: 33.8 G/DL (ref 32.3–36.5)
MCV RBC AUTO: 95 FL (ref 81.4–97.8)
MONOCYTES # BLD AUTO: 0.5 K/UL (ref 0–0.85)
MONOCYTES NFR BLD AUTO: 5.5 % (ref 0–13.4)
NEUTROPHILS # BLD AUTO: 7.05 K/UL (ref 1.82–7.42)
NEUTROPHILS NFR BLD: 77.1 % (ref 44–72)
NRBC # BLD AUTO: 0 K/UL
NRBC BLD-RTO: 0 /100 WBC (ref 0–0.2)
PLATELET # BLD AUTO: 221 K/UL (ref 164–446)
PMV BLD AUTO: 10.1 FL (ref 9–12.9)
POTASSIUM SERPL-SCNC: 4.5 MMOL/L (ref 3.6–5.5)
PROT SERPL-MCNC: 7.4 G/DL (ref 6–8.2)
RBC # BLD AUTO: 5.04 M/UL (ref 4.7–6.1)
SODIUM SERPL-SCNC: 139 MMOL/L (ref 135–145)
TROPONIN T SERPL-MCNC: <6 NG/L (ref 6–19)
WBC # BLD AUTO: 9.2 K/UL (ref 4.8–10.8)

## 2025-07-08 PROCEDURE — 80053 COMPREHEN METABOLIC PANEL: CPT

## 2025-07-08 PROCEDURE — 74177 CT ABD & PELVIS W/CONTRAST: CPT

## 2025-07-08 PROCEDURE — 700117 HCHG RX CONTRAST REV CODE 255: Performed by: EMERGENCY MEDICINE

## 2025-07-08 PROCEDURE — 84484 ASSAY OF TROPONIN QUANT: CPT

## 2025-07-08 PROCEDURE — 96374 THER/PROPH/DIAG INJ IV PUSH: CPT

## 2025-07-08 PROCEDURE — 93005 ELECTROCARDIOGRAM TRACING: CPT | Mod: TC

## 2025-07-08 PROCEDURE — 99285 EMERGENCY DEPT VISIT HI MDM: CPT

## 2025-07-08 PROCEDURE — 85025 COMPLETE CBC W/AUTO DIFF WBC: CPT

## 2025-07-08 PROCEDURE — 36415 COLL VENOUS BLD VENIPUNCTURE: CPT

## 2025-07-08 PROCEDURE — 93005 ELECTROCARDIOGRAM TRACING: CPT | Mod: TC | Performed by: EMERGENCY MEDICINE

## 2025-07-08 PROCEDURE — 700105 HCHG RX REV CODE 258: Performed by: EMERGENCY MEDICINE

## 2025-07-08 PROCEDURE — 72125 CT NECK SPINE W/O DYE: CPT

## 2025-07-08 PROCEDURE — 70450 CT HEAD/BRAIN W/O DYE: CPT

## 2025-07-08 PROCEDURE — 700111 HCHG RX REV CODE 636 W/ 250 OVERRIDE (IP): Mod: JZ | Performed by: EMERGENCY MEDICINE

## 2025-07-08 PROCEDURE — 70486 CT MAXILLOFACIAL W/O DYE: CPT

## 2025-07-08 RX ORDER — PHENOL 1.4 %
30 AEROSOL, SPRAY (ML) MUCOUS MEMBRANE
COMMUNITY

## 2025-07-08 RX ORDER — KETOROLAC TROMETHAMINE 15 MG/ML
15 INJECTION, SOLUTION INTRAMUSCULAR; INTRAVENOUS ONCE
Status: COMPLETED | OUTPATIENT
Start: 2025-07-08 | End: 2025-07-08

## 2025-07-08 RX ORDER — SODIUM CHLORIDE 9 MG/ML
1000 INJECTION, SOLUTION INTRAVENOUS ONCE
Status: COMPLETED | OUTPATIENT
Start: 2025-07-08 | End: 2025-07-08

## 2025-07-08 RX ORDER — POLYETHYLENE GLYCOL 3350 17 G/17G
17 POWDER, FOR SOLUTION ORAL
COMMUNITY

## 2025-07-08 RX ADMIN — IOHEXOL 100 ML: 350 INJECTION, SOLUTION INTRAVENOUS at 15:50

## 2025-07-08 RX ADMIN — KETOROLAC TROMETHAMINE 15 MG: 15 INJECTION, SOLUTION INTRAMUSCULAR; INTRAVENOUS at 16:18

## 2025-07-08 RX ADMIN — SODIUM CHLORIDE 1000 ML: 9 INJECTION, SOLUTION INTRAVENOUS at 14:15

## 2025-07-08 ASSESSMENT — HEART SCORE
HISTORY: SLIGHTLY SUSPICIOUS
TROPONIN: LESS THAN OR EQUAL TO NORMAL LIMIT
AGE: 45-64
ECG: NORMAL
RISK FACTORS: 1-2 RISK FACTORS
HEART SCORE: 2

## 2025-07-08 NOTE — ED NOTES
PT states he stood up from bed and passed out, pt states he hit his head and is not on blood thinners but took an aleve. PT states no history of dizziness or faintness when standing from supine or sitting.

## 2025-07-08 NOTE — ED NOTES
Pt cleared for d/c  dischg instructions given to pt  verbally understands   d/c'ed to home in NAD   instructed to pt to f/u w/ PCP and referrals  he verbally understands

## 2025-07-08 NOTE — ED PROVIDER NOTES
"ED Provider Note    CHIEF COMPLAINT  Chief Complaint   Patient presents with    Syncope     States he stood up from bed and passed out, hit his head, not on blood thinners, took Aleve.         EXTERNAL RECORDS REVIEWED  Other none germane to today's visit    HPI/ROS  LIMITATION TO HISTORY   Select: : None  OUTSIDE HISTORIAN(S):  None    Georges Joe is a 64 y.o. male who presents after a syncopal episode this morning.  The patient states he awoke in his baseline state of health feeling \"peppy\".  He jumped up out of bed and had the immediate sense that he was going to pass out.  He then woke up on a tile floor with a bump on his left temple, pain in the left temple, left jaw, and left hip.  He took an Aleve modestly improved his head pain.  He notes that he is on a high protein diet and keeps a strict workout schedule, he was rock climbing 2 days ago in the [place] area.  He notes that he may have gotten behind on his hydration.  He is not anticoagulated.  He denies alcohol or drug use.  He reports having had a syncopal episode a long time ago when in his 20s.  He denies any chest pain, shortness of breath, nausea, vomiting, dysuria, melena, hematochezia.  He further notes that he feels a mass in the left side of his abdomen that he just noticed today when examining himself after his fall.  He has had a colonoscopy in the past, 8 years ago, and it was unremarkable.  Denies any constipation.  He has no abdominal pain.    PAST MEDICAL HISTORY   has a past medical history of Acid reflux, Bowel habit changes, Depression, Dysphagia, Environmental and seasonal allergies, High cholesterol (07-), Kidney stone, Pain (07-), and Urinary bladder disorder.    SURGICAL HISTORY   has a past surgical history that includes cholecystectomy; lithotripsy; hydrocelectomy child; vocal cord stripping; umbilical hernia repair; ureteroscopy; cardiac cath, left heart; cystoscopy stent removal (Right, 8/3/2017); " "ureteroscopy (Right, 8/3/2017); lasertripsy (Right, 8/3/2017); other; cystoscopy,insert ureteral stent (Right, 2021); cysto/uretero/pyeloscopy, dx (Right, 2021); and lasertripsy (Right, 2021).    FAMILY HISTORY  Family History   Problem Relation Age of Onset    Heart Attack Father     Heart Attack Brother        SOCIAL HISTORY  Social History     Tobacco Use    Smoking status: Former     Current packs/day: 0.00     Average packs/day: 0.3 packs/day for 5.0 years (1.3 ttl pk-yrs)     Types: Cigarettes     Start date: 1992     Quit date: 1997     Years since quittin.5    Smokeless tobacco: Never   Vaping Use    Vaping status: Never Used   Substance and Sexual Activity    Alcohol use: Yes     Alcohol/week: 0.0 oz     Comment: 2 per week    Drug use: No    Sexual activity: Not on file       CURRENT MEDICATIONS  Home Medications    **Home medications have not yet been reviewed for this encounter**         ALLERGIES  Allergies[1]    PHYSICAL EXAM  VITAL SIGNS: /81   Pulse 81   Temp 36.6 °C (97.8 °F) (Temporal)   Resp 16   Ht 1.753 m (5' 9\")   Wt 68.3 kg (150 lb 9.2 oz)   SpO2 98%   BMI 22.24 kg/m²    Physical Exam  Vitals and nursing note reviewed.   Constitutional:       Appearance: Normal appearance.   HENT:      Head: Normocephalic and atraumatic.      Comments: Tender to palpation over the left forehead and associated small area of swelling     Right Ear: External ear normal.      Left Ear: External ear normal.      Nose: Nose normal.      Mouth/Throat:      Mouth: Mucous membranes are dry.      Pharynx: Oropharynx is clear.      Comments: No obvious dental injury.  Tender over the left temporomandibular joint and left mandible.  No obvious facial instability.  No trismus.  Eyes:      Extraocular Movements: Extraocular movements intact.      Conjunctiva/sclera: Conjunctivae normal.      Pupils: Pupils are equal, round, and reactive to light.   Cardiovascular:      Rate and Rhythm: " Normal rate and regular rhythm.   Pulmonary:      Effort: Pulmonary effort is normal.      Breath sounds: Normal breath sounds.   Abdominal:      Palpations: Abdomen is soft. There is mass (Left lower abdominal mass).      Tenderness: There is no abdominal tenderness.   Musculoskeletal:         General: Normal range of motion.      Cervical back: Normal range of motion and neck supple.   Skin:     General: Skin is warm and dry.   Neurological:      General: No focal deficit present.      Mental Status: He is alert and oriented to person, place, and time.   Psychiatric:         Mood and Affect: Mood normal.         Behavior: Behavior normal.       EKG/LABS  Results for orders placed or performed during the hospital encounter of 07/08/25   CBC WITH DIFFERENTIAL    Collection Time: 07/08/25  2:20 PM   Result Value Ref Range    WBC 9.2 4.8 - 10.8 K/uL    RBC 5.04 4.70 - 6.10 M/uL    Hemoglobin 16.2 14.0 - 18.0 g/dL    Hematocrit 47.9 42.0 - 52.0 %    MCV 95.0 81.4 - 97.8 fL    MCH 32.1 27.0 - 33.0 pg    MCHC 33.8 32.3 - 36.5 g/dL    RDW 48.7 35.9 - 50.0 fL    Platelet Count 221 164 - 446 K/uL    MPV 10.1 9.0 - 12.9 fL    Neutrophils-Polys 77.10 (H) 44.00 - 72.00 %    Lymphocytes 15.40 (L) 22.00 - 41.00 %    Monocytes 5.50 0.00 - 13.40 %    Eosinophils 1.00 0.00 - 6.90 %    Basophils 0.50 0.00 - 1.80 %    Immature Granulocytes 0.50 0.00 - 0.90 %    Nucleated RBC 0.00 0.00 - 0.20 /100 WBC    Neutrophils (Absolute) 7.05 1.82 - 7.42 K/uL    Lymphs (Absolute) 1.41 1.00 - 4.80 K/uL    Monos (Absolute) 0.50 0.00 - 0.85 K/uL    Eos (Absolute) 0.09 0.00 - 0.51 K/uL    Baso (Absolute) 0.05 0.00 - 0.12 K/uL    Immature Granulocytes (abs) 0.05 0.00 - 0.11 K/uL    NRBC (Absolute) 0.00 K/uL   Comp Metabolic Panel    Collection Time: 07/08/25  2:20 PM   Result Value Ref Range    Sodium 139 135 - 145 mmol/L    Potassium 4.5 3.6 - 5.5 mmol/L    Chloride 102 96 - 112 mmol/L    Co2 26 20 - 33 mmol/L    Anion Gap 11.0 7.0 - 16.0    Glucose  90 65 - 99 mg/dL    Bun 19 8 - 22 mg/dL    Creatinine 1.12 0.50 - 1.40 mg/dL    Calcium 9.5 8.5 - 10.5 mg/dL    Correct Calcium 9.0 8.5 - 10.5 mg/dL    AST(SGOT) 39 12 - 45 U/L    ALT(SGPT) 41 2 - 50 U/L    Alkaline Phosphatase 64 30 - 99 U/L    Total Bilirubin 0.5 0.1 - 1.5 mg/dL    Albumin 4.6 3.2 - 4.9 g/dL    Total Protein 7.4 6.0 - 8.2 g/dL    Globulin 2.8 1.9 - 3.5 g/dL    A-G Ratio 1.6 g/dL   TROPONIN    Collection Time: 25  2:20 PM   Result Value Ref Range    Troponin T <6 6 - 19 ng/L   ESTIMATED GFR    Collection Time: 25  2:20 PM   Result Value Ref Range    GFR (CKD-EPI) 73 >60 mL/min/1.73 m 2   EKG    Collection Time: 25  4:18 PM   Result Value Ref Range    Report       [facility] Emergency Dept.    Test Date:  2025  Pt Name:    DONATO POP              Department: [facility]  MRN:        [id number]                      Room:  Gender:     Male                         Technician: [id number]  :        1960                   Requested By:ER TRIAGE PROTOCOL  Order #:    [id number]                    Reading MD: Claudio Hill MD    Measurements  Intervals                                Axis  Rate:       74                           P:          66  NE:         147                          QRS:        -44  QRSD:       107                          T:          11  QT:         386  QTc:        429    Interpretive Statements  Sinus rhythm  Left axis deviation  Abnormal R-wave progression, early transition  Baseline wander in lead(s) V1  No previous ECG available for comparison  Electronically Signed On 2025 16:18:39 PDT by Claudio Hill MD       I have independently interpreted this EKG    RADIOLOGY/PROCEDURES   I have independently interpreted the diagnostic imaging associated with this visit and am waiting the final reading from the radiologist.   My preliminary interpretation is as follows: No intracranial hemorrhage    Radiologist  interpretation:  CT-ABDOMEN-PELVIS WITH   Final Result      1.  No acute inflammatory change in the abdomen or pelvis.   2.  No mass or fluid collection identified.      CT-MAXILLOFACIAL W/O PLUS RECONS   Final Result      No acute maxillofacial fracture.      CT-HEAD W/O   Final Result         1. No acute intracranial abnormality. No evidence of acute intracranial hemorrhage or mass lesion.                           CT-CSPINE WITHOUT PLUS RECONS   Final Result         1. No acute fracture from C1 through T1 is visualized.           COURSE & MEDICAL DECISION MAKING    ASSESSMENT, COURSE AND PLAN  Care Narrative: This is a 64-year-old very physically fit male who is here with left forehead and jaw pain as well as left hip pain after syncopal episode.    Differential diagnosis includes, but is not limited to, fracture, dislocation, ICH, contusion.  Also concern for electrolyte abnormality, hypo-/hyperglycemia, anemia, arrhythmia, ACS, dehydration, vasovagal syncope, orthostatic syncope infection.    Arrives afebrile with normal vital signs.  Appears dehydrated with dry mucous membranes but nontoxic.  He is alert, oriented, warm and well-perfused.  There is a small area of swelling over the left temple/forehead and there is tenderness over the left jaw.  He has no trismus and there is no obvious dental injury.  Patient notes he is able to palpate a mass in the left side of his abdomen which he just noticed today when examining himself after his fall.  Will obtain imaging for that given that he is going to undergo imaging of his head and C-spine for traumatic injury.    Patient will be given IV fluids due to clinical evidence of dehydration.  He will also receive a dose of Toradol.    EKG is reassuring without signs of acute ischemia.  He has no chest pain.  Troponin is negative.  Heart score: 2    CBC without leukocytosis or anemia.  He has a mildly elevated neutrophil percentage with slightly decreased lymphocytes  however the remainder of the differential is within normal limits.  Low suspicion that this is the etiology of today's presentation.  Metabolic panel is normal across-the-board although his BUN/creatinine ratio suggests dehydration which is consistent with his clinical picture.  IV fluids were started.  Troponin is negative/undetectable.    A CT of the head and C-spine thankfully without traumatic injury.  Because the patient reported a mass in the left side of his lower abdomen that I feel that I can palpate as well I obtained CT imaging which was thankfully reassuring.  It is possible that this is stool as he is quite thin.    Patient was reevaluated at bedside.  He is resting comfortably and has ambulated and tolerated p.o. without any difficulty.  We discussed lab and imaging results which demonstrated some incidental findings including bilateral renal cysts and bilateral fat-containing inguinal hernias.  I will place referrals on his behalf to the appropriate specialists.  I suspect his symptoms were due to an orthostatic episode although he is not orthostatic here.  I recommended he increase his p.o. hydration.  His vital signs remain normal and stable.  Discharged in good and stable condition with strict return precautions.    CHEST PAIN:   HEART Score for Major Cardiac Events  HEART Score     History: Slightly suspicious  ECG: Normal  Age: 45-64  Risk Factors: 1-2 risk factors  Troponin: Less than or equal to normal limit    Heart Score: 2    Total Score   0-3 Points = Low Score, risk of MACE 0.9-1.7%.  4-6 Points = Moderate Score, risk of MACE 12-16.6%  7-10 Points = High Score, risk of MACE 50-65%    Hydration: Based on the patient's presentation of Dehydration the patient was given IV fluids. IV Hydration was used because oral hydration was not adequate alone. Upon recheck following hydration, the patient was improved.    ADDITIONAL PROBLEMS MANAGED  None    DISPOSITION AND DISCUSSIONS  I have discussed  management of the patient with the following physicians and AINSLEY's: None    Discussion of management with other QHP or appropriate source(s): None     Escalation of care considered, and ultimately not performed:acute inpatient care management, however at this time, the patient is most appropriate for outpatient management    Barriers to care at this time, including but not limited to: None.     Decision tools and prescription drugs considered including, but not limited to: N/A.    FINAL DIAGNOSIS  1. Orthostatic syncope    2. Bilateral inguinal hernia without obstruction or gangrene, recurrence not specified    3. Renal cyst       Electronically signed by: Claudio Hill M.D., 7/8/2025 1:49 PM           [1]   Allergies  Allergen Reactions    Sulfa Drugs Unspecified     Rxn - about 2009/10

## 2025-07-08 NOTE — ED NOTES
Pt was evaluated by MD and orders rec'ed and done   SL placed L outer AC  bld drawn, sent to lab    IV NS infusing well   IV med held until CT done per MD

## 2025-07-08 NOTE — ED NOTES
MD at  for re-evaluation and discussion of results    pt verbally understands results impending d/c'ed to home

## 2025-07-08 NOTE — ED TRIAGE NOTES
"Chief Complaint   Patient presents with    Syncope     States he stood up from bed and passed out, hit his head, not on blood thinners, took Aleve.       /81   Pulse 81   Temp 36.6 °C (97.8 °F) (Temporal)   Resp 16   Ht 1.753 m (5' 9\")   Wt 68.3 kg (150 lb 9.2 oz)   SpO2 98%   BMI 22.24 kg/m²     "

## 2025-07-08 NOTE — DISCHARGE INSTRUCTIONS
You were seen in the ER after a syncopal episode (fainting).  Thankfully your labs and imaging are reassuring and did not reveal any acute abnormality that requires further workup, consultation, or admission to the hospital.  We discussed several incidental findings as seen on your CT scan which included bilateral (both sides) inguinal hernias containing fat as well as bilateral cysts on your kidneys.  I placed referrals to general surgery and urology so that you can follow-up.  I suspect that your syncopal episode today was due to dehydration and fluid depletion.  I recommend you continue to drink at least 8 ounces of clear nonalcoholic liquid every hour to maintain your hydration on a regular day and if you are exerting yourself increase that to around 12 ounces every hour.  Consider adding an electrolyte solutions so that you do not decrease your sodium levels.  Please follow-up with your primary care physician this week for recheck.  Return with new or worsening symptoms.  I am glad you feel better!

## 2025-08-15 ENCOUNTER — HOSPITAL ENCOUNTER (OUTPATIENT)
Dept: LAB | Facility: MEDICAL CENTER | Age: 65
End: 2025-08-15
Attending: PHYSICIAN ASSISTANT
Payer: COMMERCIAL

## 2025-08-15 ENCOUNTER — HOSPITAL ENCOUNTER (OUTPATIENT)
Dept: RADIOLOGY | Facility: MEDICAL CENTER | Age: 65
End: 2025-08-15
Attending: FAMILY MEDICINE
Payer: COMMERCIAL

## 2025-08-15 DIAGNOSIS — M54.50 LUMBAR PAIN: ICD-10-CM

## 2025-08-15 DIAGNOSIS — M54.50 LOIN PAIN-HEMATURIA SYNDROME: ICD-10-CM

## 2025-08-15 DIAGNOSIS — M22.2X1 PATELLOFEMORAL DISORDER OF RIGHT KNEE: ICD-10-CM

## 2025-08-15 DIAGNOSIS — M16.0 ARTHRITIS OF BOTH HIPS: ICD-10-CM

## 2025-08-15 DIAGNOSIS — R31.9 LOIN PAIN-HEMATURIA SYNDROME: ICD-10-CM

## 2025-08-15 DIAGNOSIS — M22.2X2 PATELLOFEMORAL DISORDER OF LEFT KNEE: ICD-10-CM

## 2025-08-15 LAB — PSA SERPL DL<=0.01 NG/ML-MCNC: 0.86 NG/ML (ref 0–4)

## 2025-08-15 PROCEDURE — 84402 ASSAY OF FREE TESTOSTERONE: CPT

## 2025-08-15 PROCEDURE — 84153 ASSAY OF PSA TOTAL: CPT

## 2025-08-15 PROCEDURE — 84403 ASSAY OF TOTAL TESTOSTERONE: CPT

## 2025-08-15 PROCEDURE — 36415 COLL VENOUS BLD VENIPUNCTURE: CPT

## 2025-08-15 PROCEDURE — 73560 X-RAY EXAM OF KNEE 1 OR 2: CPT | Mod: RT

## 2025-08-15 PROCEDURE — 73560 X-RAY EXAM OF KNEE 1 OR 2: CPT | Mod: LT

## 2025-08-15 PROCEDURE — 73521 X-RAY EXAM HIPS BI 2 VIEWS: CPT

## 2025-08-15 PROCEDURE — 72100 X-RAY EXAM L-S SPINE 2/3 VWS: CPT

## 2025-08-15 PROCEDURE — 84270 ASSAY OF SEX HORMONE GLOBUL: CPT

## 2025-08-17 LAB
SHBG SERPL-SCNC: 25 NMOL/L (ref 19–76)
TESTOST FREE MFR SERPL: 2.1 % (ref 1.6–2.9)
TESTOST FREE SERPL-MCNC: 62 PG/ML (ref 47–244)
TESTOST SERPL-MCNC: 299 NG/DL (ref 300–720)

## (undated) DEVICE — DILATOR NOTTINGHAM 6F-10FRX70CM

## (undated) DEVICE — MASK, LARYNGEAL AIRWAY #4

## (undated) DEVICE — MASK ANESTHESIA ADULT  - (100/CA)

## (undated) DEVICE — PROTECTOR ULNA NERVE - (36PR/CA)

## (undated) DEVICE — TUBING CLEARLINK DUO-VENT - C-FLO (48EA/CA)

## (undated) DEVICE — HEAD HOLDER JUNIOR/ADULT

## (undated) DEVICE — BAG URODRAIN WITH TUBING - (20/CA)

## (undated) DEVICE — ELECTRODE 850 FOAM ADHESIVE - HYDROGEL RADIOTRNSPRNT (50/PK)

## (undated) DEVICE — CATHETER URET DUAL LUMEN

## (undated) DEVICE — SCOPE DIGITAL URETEROSCOPE DISPOSABLE

## (undated) DEVICE — LACTATED RINGERS INJ 1000 ML - (14EA/CA 60CA/PF)

## (undated) DEVICE — KIT ANESTHESIA W/CIRCUIT & 3/LT BAG W/FILTER (20EA/CA)

## (undated) DEVICE — SODIUM CHL. IRRIGATION 0.9% 3000ML (4EA/CA 65CA/PF)

## (undated) DEVICE — SET LEADWIRE 5 LEAD BEDSIDE DISPOSABLE ECG (1SET OF 5/EA)

## (undated) DEVICE — GOWN SURGEONS X-LARGE - DISP. (30/CA)

## (undated) DEVICE — GLOVE BIOGEL PI INDICATOR SZ 6.5 SURGICAL PF LF - (50/BX 4BX/CA)

## (undated) DEVICE — NEPTUNE 4 PORT MANIFOLD - (20/PK)

## (undated) DEVICE — SENSOR SPO2 NEO LNCS ADHESIVE (20/BX) SEE USER NOTES

## (undated) DEVICE — SET EXTENSION WITH 2 PORTS (48EA/CA) ***PART #2C8610 IS A SUBSTITUTE*****

## (undated) DEVICE — ZIPWIRE .038 STRAIGHT BENTSON TIP (5EA/BX)

## (undated) DEVICE — WIRE GUIDE SENSOR DUAL FLEX - 5/BX

## (undated) DEVICE — FIBER LASER MOSES 200 UM

## (undated) DEVICE — SPONGE GAUZESTER 4 X 4 4PLY - (128PK/CA)

## (undated) DEVICE — GLOVE BIOGEL SZ 7.5 SURGICAL PF LTX - (50PR/BX 4BX/CA)

## (undated) DEVICE — SHEATH NAVIGATOR 11/13 X 36 URETERAL ACCESS

## (undated) DEVICE — CATHETER URET OPEN END 6FR (10EA/BX)

## (undated) DEVICE — GOWN SURGICAL X-LARGE ULTRA - FILM-REINFORCED (20/CA)

## (undated) DEVICE — SET IRRIGATION CYSTOSCOPY Y-TYPE L81 IN (20EA/CA)

## (undated) DEVICE — MEDICINE CUP STERILE 2 OZ - (100/CA)

## (undated) DEVICE — WATER IRRIGATION STERILE 1000ML (12EA/CA)

## (undated) DEVICE — COVER FOOT UNIVERSAL DISP. - (25EA/CA)

## (undated) DEVICE — KIT ROOM DECONTAMINATION

## (undated) DEVICE — JELLY SURGILUBE STERILE TUBE 4.25 OZ (1/EA)

## (undated) DEVICE — CONNECTOR HOSE NEPTUNE FOR CYSTO ROOM

## (undated) DEVICE — SLEEVE, VASO, THIGH, MED

## (undated) DEVICE — GOWN WARMING STANDARD FLEX - (30/CA)

## (undated) DEVICE — WATER IRRIG. STER. 1500 ML - (9/CA)

## (undated) DEVICE — WATER IRRIG. STER 3000 ML - (4/CA)

## (undated) DEVICE — BASKET ZERO TIP

## (undated) DEVICE — JELLY, KY 2 0Z STERILE

## (undated) DEVICE — CONTAINER SPECIMEN BAG OR - STERILE 4 OZ W/LID (100EA/CA)

## (undated) DEVICE — PACK CYSTOSCOPY III - (8/CA)

## (undated) DEVICE — TUBE CONNECT SUCTION CLEAR 120 X 1/4" (50EA/CA)"

## (undated) DEVICE — SUCTION INSTRUMENT YANKAUER BULBOUS TIP W/O VENT (50EA/CA)

## (undated) DEVICE — CATHETER URETHRAL OPEN END AXXCESS (10EA/BX)

## (undated) DEVICE — LASER TRAC TIP 200 MIRCON FOR 100 WATT LASER